# Patient Record
Sex: FEMALE | Race: WHITE | NOT HISPANIC OR LATINO | Employment: OTHER | URBAN - METROPOLITAN AREA
[De-identification: names, ages, dates, MRNs, and addresses within clinical notes are randomized per-mention and may not be internally consistent; named-entity substitution may affect disease eponyms.]

---

## 2018-03-30 PROCEDURE — 87086 URINE CULTURE/COLONY COUNT: CPT | Performed by: FAMILY MEDICINE

## 2018-03-30 PROCEDURE — 87147 CULTURE TYPE IMMUNOLOGIC: CPT | Performed by: FAMILY MEDICINE

## 2018-04-02 ENCOUNTER — TELEPHONE (OUTPATIENT)
Dept: URGENT CARE | Facility: CLINIC | Age: 38
End: 2018-04-02

## 2018-04-02 NOTE — TELEPHONE ENCOUNTER
Called pt with urine culture results. Pt is feeling better and will let us know if she doesn't get better. Pt JOHNNY. Reviewed by LISA HARRIS

## 2018-05-15 PROBLEM — J20.9 ACUTE BRONCHITIS: Status: ACTIVE | Noted: 2018-05-15

## 2018-09-25 PROCEDURE — 87147 CULTURE TYPE IMMUNOLOGIC: CPT | Performed by: FAMILY MEDICINE

## 2018-09-25 PROCEDURE — 87086 URINE CULTURE/COLONY COUNT: CPT | Performed by: FAMILY MEDICINE

## 2018-09-27 ENCOUNTER — TELEPHONE (OUTPATIENT)
Dept: URGENT CARE | Facility: CLINIC | Age: 38
End: 2018-09-27

## 2018-09-27 DIAGNOSIS — N30.00 ACUTE CYSTITIS WITHOUT HEMATURIA: Primary | ICD-10-CM

## 2018-09-27 RX ORDER — CEPHALEXIN 500 MG/1
500 CAPSULE ORAL 3 TIMES DAILY
Qty: 21 CAPSULE | Refills: 0 | OUTPATIENT
Start: 2018-09-27 | End: 2019-04-19

## 2018-09-27 NOTE — TELEPHONE ENCOUNTER
Spoke with pt regarding urine culture.  She reports no relief of symptoms.  Discussed changing antibiotics and and pt requested diflucan.  Pt verbalized understanding of change and following up if not improved in a few days.  Pt reports she has taken Omnicef before without reaction.

## 2019-04-19 PROBLEM — N30.01 ACUTE CYSTITIS WITH HEMATURIA: Status: ACTIVE | Noted: 2019-04-19

## 2019-04-19 PROCEDURE — 87147 CULTURE TYPE IMMUNOLOGIC: CPT | Performed by: NURSE PRACTITIONER

## 2019-04-19 PROCEDURE — 87086 URINE CULTURE/COLONY COUNT: CPT | Performed by: NURSE PRACTITIONER

## 2019-04-19 PROCEDURE — 87088 URINE BACTERIA CULTURE: CPT | Performed by: NURSE PRACTITIONER

## 2019-04-19 PROCEDURE — 87186 SC STD MICRODIL/AGAR DIL: CPT | Performed by: NURSE PRACTITIONER

## 2019-04-22 ENCOUNTER — TELEPHONE (OUTPATIENT)
Dept: URGENT CARE | Facility: CLINIC | Age: 39
End: 2019-04-22

## 2019-04-23 ENCOUNTER — TELEPHONE (OUTPATIENT)
Dept: URGENT CARE | Facility: CLINIC | Age: 39
End: 2019-04-23

## 2019-04-25 ENCOUNTER — TELEPHONE (OUTPATIENT)
Dept: URGENT CARE | Facility: CLINIC | Age: 39
End: 2019-04-25

## 2019-05-10 ENCOUNTER — TELEPHONE (OUTPATIENT)
Dept: URGENT CARE | Facility: CLINIC | Age: 39
End: 2019-05-10

## 2019-09-21 ENCOUNTER — APPOINTMENT (OUTPATIENT)
Dept: CT IMAGING | Facility: HOSPITAL | Age: 39
End: 2019-09-21

## 2019-09-21 ENCOUNTER — HOSPITAL ENCOUNTER (EMERGENCY)
Facility: HOSPITAL | Age: 39
Discharge: HOME | End: 2019-09-21
Attending: EMERGENCY MEDICINE | Admitting: EMERGENCY MEDICINE

## 2019-09-21 VITALS
SYSTOLIC BLOOD PRESSURE: 152 MMHG | OXYGEN SATURATION: 99 % | RESPIRATION RATE: 16 BRPM | WEIGHT: 143 LBS | DIASTOLIC BLOOD PRESSURE: 100 MMHG | HEIGHT: 64 IN | HEART RATE: 88 BPM | BODY MASS INDEX: 24.41 KG/M2 | TEMPERATURE: 98.5 F

## 2019-09-21 DIAGNOSIS — R59.0 INGUINAL LYMPHADENOPATHY: Primary | ICD-10-CM

## 2019-09-21 LAB
ALBUMIN SERPL-MCNC: 4.8 G/DL (ref 3.5–5.2)
ALBUMIN/GLOB SERPL: 1.7 G/DL
ALP SERPL-CCNC: 61 U/L (ref 39–117)
ALT SERPL W P-5'-P-CCNC: 10 U/L (ref 1–33)
ANION GAP SERPL CALCULATED.3IONS-SCNC: 13 MMOL/L (ref 5–15)
AST SERPL-CCNC: 19 U/L (ref 1–32)
B-HCG UR QL: NEGATIVE
BACTERIA UR QL AUTO: ABNORMAL /HPF
BASOPHILS # BLD AUTO: 0.03 10*3/MM3 (ref 0–0.2)
BASOPHILS NFR BLD AUTO: 0.4 % (ref 0–1.5)
BILIRUB SERPL-MCNC: 0.3 MG/DL (ref 0.2–1.2)
BILIRUB UR QL STRIP: NEGATIVE
BUN BLD-MCNC: 10 MG/DL (ref 6–20)
BUN/CREAT SERPL: 13.2 (ref 7–25)
CALCIUM SPEC-SCNC: 9.2 MG/DL (ref 8.6–10.5)
CHLORIDE SERPL-SCNC: 104 MMOL/L (ref 98–107)
CLARITY UR: ABNORMAL
CO2 SERPL-SCNC: 25 MMOL/L (ref 22–29)
COLOR UR: YELLOW
CREAT BLD-MCNC: 0.76 MG/DL (ref 0.57–1)
DEPRECATED RDW RBC AUTO: 45.3 FL (ref 37–54)
EOSINOPHIL # BLD AUTO: 0.09 10*3/MM3 (ref 0–0.4)
EOSINOPHIL NFR BLD AUTO: 1.2 % (ref 0.3–6.2)
ERYTHROCYTE [DISTWIDTH] IN BLOOD BY AUTOMATED COUNT: 16.6 % (ref 12.3–15.4)
GFR SERPL CREATININE-BSD FRML MDRD: 85 ML/MIN/1.73
GLOBULIN UR ELPH-MCNC: 2.9 GM/DL
GLUCOSE BLD-MCNC: 103 MG/DL (ref 65–99)
GLUCOSE UR STRIP-MCNC: NEGATIVE MG/DL
HCT VFR BLD AUTO: 34.6 % (ref 34–46.6)
HGB BLD-MCNC: 10.1 G/DL (ref 12–15.9)
HGB UR QL STRIP.AUTO: ABNORMAL
HOLD SPECIMEN: NORMAL
HOLD SPECIMEN: NORMAL
HYALINE CASTS UR QL AUTO: ABNORMAL /LPF
IMM GRANULOCYTES # BLD AUTO: 0.02 10*3/MM3 (ref 0–0.05)
IMM GRANULOCYTES NFR BLD AUTO: 0.3 % (ref 0–0.5)
INTERNAL NEGATIVE CONTROL: NEGATIVE
INTERNAL POSITIVE CONTROL: POSITIVE
KETONES UR QL STRIP: NEGATIVE
LEUKOCYTE ESTERASE UR QL STRIP.AUTO: NEGATIVE
LIPASE SERPL-CCNC: 34 U/L (ref 13–60)
LYMPHOCYTES # BLD AUTO: 2.56 10*3/MM3 (ref 0.7–3.1)
LYMPHOCYTES NFR BLD AUTO: 33.5 % (ref 19.6–45.3)
Lab: NORMAL
MCH RBC QN AUTO: 22.2 PG (ref 26.6–33)
MCHC RBC AUTO-ENTMCNC: 29.2 G/DL (ref 31.5–35.7)
MCV RBC AUTO: 76.2 FL (ref 79–97)
MONOCYTES # BLD AUTO: 0.31 10*3/MM3 (ref 0.1–0.9)
MONOCYTES NFR BLD AUTO: 4.1 % (ref 5–12)
NEUTROPHILS # BLD AUTO: 4.63 10*3/MM3 (ref 1.7–7)
NEUTROPHILS NFR BLD AUTO: 60.5 % (ref 42.7–76)
NITRITE UR QL STRIP: NEGATIVE
NRBC BLD AUTO-RTO: 0 /100 WBC (ref 0–0.2)
PH UR STRIP.AUTO: 7.5 [PH] (ref 5–8)
PLATELET # BLD AUTO: 518 10*3/MM3 (ref 140–450)
PMV BLD AUTO: 8.9 FL (ref 6–12)
POTASSIUM BLD-SCNC: 3.5 MMOL/L (ref 3.5–5.2)
PROT SERPL-MCNC: 7.7 G/DL (ref 6–8.5)
PROT UR QL STRIP: NEGATIVE
RBC # BLD AUTO: 4.54 10*6/MM3 (ref 3.77–5.28)
RBC # UR: ABNORMAL /HPF
REF LAB TEST METHOD: ABNORMAL
SODIUM BLD-SCNC: 142 MMOL/L (ref 136–145)
SP GR UR STRIP: 1.01 (ref 1–1.03)
SQUAMOUS #/AREA URNS HPF: ABNORMAL /HPF
UROBILINOGEN UR QL STRIP: ABNORMAL
WBC NRBC COR # BLD: 7.64 10*3/MM3 (ref 3.4–10.8)
WBC UR QL AUTO: ABNORMAL /HPF
WHOLE BLOOD HOLD SPECIMEN: NORMAL
WHOLE BLOOD HOLD SPECIMEN: NORMAL

## 2019-09-21 PROCEDURE — 81025 URINE PREGNANCY TEST: CPT | Performed by: EMERGENCY MEDICINE

## 2019-09-21 PROCEDURE — 83690 ASSAY OF LIPASE: CPT | Performed by: EMERGENCY MEDICINE

## 2019-09-21 PROCEDURE — 99283 EMERGENCY DEPT VISIT LOW MDM: CPT

## 2019-09-21 PROCEDURE — 81001 URINALYSIS AUTO W/SCOPE: CPT | Performed by: EMERGENCY MEDICINE

## 2019-09-21 PROCEDURE — 25010000002 IOPAMIDOL 61 % SOLUTION: Performed by: EMERGENCY MEDICINE

## 2019-09-21 PROCEDURE — 74177 CT ABD & PELVIS W/CONTRAST: CPT

## 2019-09-21 PROCEDURE — 80053 COMPREHEN METABOLIC PANEL: CPT | Performed by: EMERGENCY MEDICINE

## 2019-09-21 PROCEDURE — 85025 COMPLETE CBC W/AUTO DIFF WBC: CPT | Performed by: EMERGENCY MEDICINE

## 2019-09-21 RX ORDER — SODIUM CHLORIDE 0.9 % (FLUSH) 0.9 %
10 SYRINGE (ML) INJECTION AS NEEDED
Status: DISCONTINUED | OUTPATIENT
Start: 2019-09-21 | End: 2019-09-21 | Stop reason: HOSPADM

## 2019-09-21 RX ORDER — CEPHALEXIN 500 MG/1
500 CAPSULE ORAL 4 TIMES DAILY
Qty: 40 CAPSULE | Refills: 0 | OUTPATIENT
Start: 2019-09-21 | End: 2020-04-14

## 2019-09-21 RX ADMIN — IOPAMIDOL 80 ML: 612 INJECTION, SOLUTION INTRAVENOUS at 18:08

## 2019-09-21 NOTE — DISCHARGE INSTRUCTIONS
Keflex as prescribed.  Return if worse.  Choose a provider from the list below to establish a PCP and make arrangements for follow up.  Follow up with one of the [facility] Primary Care Providers below to setup primary care. If you need assistance coordinating a primary care appointment with a [facility] Primary Care Provider, please contact the Primary Care Coordinators at [phone] for appointment scheduling.    [facility], Primary Care   [address], [address]   [place], [place] [address] [phone]    [facility] Internal Medicine & Endocrinology  [address], [address]  [place] [address] [phone]    [facility] Family Medicine  [address], [address]   [place] [address] [phone]    [facility] Primary Care  [address], [address]  [place], [place] [address] [phone]    [facility], Primary Care,   [address], [address]   [place], [place] [address] [phone]    [facility] Primary Care  [address]  [place], [place] [address] [phone]    [facility], Primary Care  [address], [address]  [place], [place] [address] [phone]    [facility] Internal Medicine & Pediatrics  [address], [address]   [place] [address] [phone]    [facility], Primary Care  [address] [address] [phone]      [facility] Primary Care  [address], [address]   [place] [address] [phone]    [facility] Family Medicine  [address] [place] [address] [place] [address] [phone]

## 2019-09-21 NOTE — ED PROVIDER NOTES
"Subjective   Louise Martinez is a 39 y.o. female who presents to the ED with complaints of lower abdominal pain. She states that she has a swollen \"lymphnode\" to her right groin, and this is the cause of her abdominal pain. She relates that she had similar symptoms in 2008, however, it was not thi severe. She also complains of back pain and sleep disturbance secondary to her pain. She denies any fever, nausea, vomiting, bowel changes, vaginal discharge, dysuria, or any urinary symptoms. She advises that she has an IUD that was suppose to be removed in 2017. She is not on any daily medications. She has no significant past medical history. Her past surgical history includes a bladder suspension. She smokes half a pack per day. She denies any alcohol use or IV drug use. She states she has smoked marijuana in the past. She does not have a PCP. There are no other acute complaints at this time.        History provided by:  Patient  Abdominal Pain   Pain location:  LLQ and RLQ  Pain radiates to:  Does not radiate  Pain severity:  Moderate  Onset quality:  Sudden  Timing:  Constant  Progression:  Unchanged  Chronicity:  New  Relieved by:  None tried  Worsened by:  Nothing  Ineffective treatments:  None tried  Associated symptoms: no chest pain, no constipation, no cough, no diarrhea, no dysuria, no fever, no nausea, no shortness of breath, no vaginal discharge and no vomiting    Risk factors: no alcohol abuse and not obese        Review of Systems   Constitutional: Negative for fever.   Respiratory: Negative for cough and shortness of breath.    Cardiovascular: Negative for chest pain.   Gastrointestinal: Positive for abdominal pain. Negative for constipation, diarrhea, nausea and vomiting.   Genitourinary: Negative for decreased urine volume, difficulty urinating, dysuria, urgency and vaginal discharge.   Musculoskeletal: Positive for back pain.   Skin: Negative for rash.   Allergic/Immunologic: Negative for " immunocompromised state.   Neurological: Negative for headaches.   Hematological: Positive for adenopathy (palpable right inguinal node).   Psychiatric/Behavioral: Positive for sleep disturbance.   All other systems reviewed and are negative.      History reviewed. No pertinent past medical history.    Allergies   Allergen Reactions   • Sulfa Antibiotics Hives   • Amoxicillin Rash       Past Surgical History:   Procedure Laterality Date   • BLADDER SUSPENSION         History reviewed. No pertinent family history.    Social History     Socioeconomic History   • Marital status: Single     Spouse name: Not on file   • Number of children: Not on file   • Years of education: Not on file   • Highest education level: Not on file   Tobacco Use   • Smoking status: Current Every Day Smoker   • Smokeless tobacco: Never Used   Substance and Sexual Activity   • Alcohol use: Yes   • Drug use: No   • Sexual activity: Yes     Partners: Male, Female         Objective   Physical Exam   Constitutional: She is oriented to person, place, and time. She appears well-developed and well-nourished. No distress.   HENT:   Head: Normocephalic and atraumatic.   Nose: Nose normal.   Mouth/Throat: Oropharynx is clear and moist.   Eyes: Conjunctivae are normal. Pupils are equal, round, and reactive to light. No scleral icterus.   Neck: Normal range of motion. Neck supple.   Cardiovascular: Normal rate, regular rhythm and normal heart sounds.   No murmur heard.  Pulmonary/Chest: Effort normal and breath sounds normal. No respiratory distress.   Abdominal: Soft. She exhibits no distension. There is no tenderness.   Prominence in right groin with tenderness with palpation. No abdominal tenderness with palpation.   Musculoskeletal: Normal range of motion. She exhibits tenderness. She exhibits no edema.   No lower extremity edema. Prominence in right groin with tenderness with palpation.   Neurological: She is alert and oriented to person, place, and  time.   Skin: Skin is warm and dry.   Psychiatric: She has a normal mood and affect. Her behavior is normal.   Nursing note and vitals reviewed.      Procedures         ED Course      The patient appears in no distress.  There is a palpable prominence in the right groin that appears to be consistent with a lymph node.  No erythema.  There is tenderness on palpation of the node.  The patient has no visible wounds other than a small well-healing scratch to the right shin.  White count is normal at 7.64.  CT of the pelvis with contrast shows a small right inguinal node with no other acute findings.  Given the increasing prominence and tenderness of the right inguinal node, as well as history of a recent minor wound to the right shin, I will treat her with a short course of Keflex to see if her adenopathy resolves.  I do not see any indication of blood dyscrasias.  I will plan to discharge her for follow-up.  Recent Results (from the past 24 hour(s))   Comprehensive Metabolic Panel    Collection Time: 09/21/19  4:46 PM   Result Value Ref Range    Glucose 103 (H) 65 - 99 mg/dL    BUN 10 6 - 20 mg/dL    Creatinine 0.76 0.57 - 1.00 mg/dL    Sodium 142 136 - 145 mmol/L    Potassium 3.5 3.5 - 5.2 mmol/L    Chloride 104 98 - 107 mmol/L    CO2 25.0 22.0 - 29.0 mmol/L    Calcium 9.2 8.6 - 10.5 mg/dL    Total Protein 7.7 6.0 - 8.5 g/dL    Albumin 4.80 3.50 - 5.20 g/dL    ALT (SGPT) 10 1 - 33 U/L    AST (SGOT) 19 1 - 32 U/L    Alkaline Phosphatase 61 39 - 117 U/L    Total Bilirubin 0.3 0.2 - 1.2 mg/dL    eGFR Non African Amer 85 >60 mL/min/1.73    Globulin 2.9 gm/dL    A/G Ratio 1.7 g/dL    BUN/Creatinine Ratio 13.2 7.0 - 25.0    Anion Gap 13.0 5.0 - 15.0 mmol/L   Lipase    Collection Time: 09/21/19  4:46 PM   Result Value Ref Range    Lipase 34 13 - 60 U/L   Urinalysis With Microscopic If Indicated (No Culture) - Urine, Clean Catch    Collection Time: 09/21/19  4:46 PM   Result Value Ref Range    Color, UA Yellow Yellow, Straw     Appearance, UA Cloudy (A) Clear    pH, UA 7.5 5.0 - 8.0    Specific Gravity, UA 1.012 1.001 - 1.030    Glucose, UA Negative Negative    Ketones, UA Negative Negative    Bilirubin, UA Negative Negative    Blood, UA Moderate (2+) (A) Negative    Protein, UA Negative Negative    Leuk Esterase, UA Negative Negative    Nitrite, UA Negative Negative    Urobilinogen, UA 0.2 E.U./dL 0.2 - 1.0 E.U./dL   Light Blue Top    Collection Time: 09/21/19  4:46 PM   Result Value Ref Range    Extra Tube hold for add-on    Green Top (Gel)    Collection Time: 09/21/19  4:46 PM   Result Value Ref Range    Extra Tube Hold for add-ons.    Lavender Top    Collection Time: 09/21/19  4:46 PM   Result Value Ref Range    Extra Tube hold for add-on    Gold Top - SST    Collection Time: 09/21/19  4:46 PM   Result Value Ref Range    Extra Tube Hold for add-ons.    CBC Auto Differential    Collection Time: 09/21/19  4:46 PM   Result Value Ref Range    WBC 7.64 3.40 - 10.80 10*3/mm3    RBC 4.54 3.77 - 5.28 10*6/mm3    Hemoglobin 10.1 (L) 12.0 - 15.9 g/dL    Hematocrit 34.6 34.0 - 46.6 %    MCV 76.2 (L) 79.0 - 97.0 fL    MCH 22.2 (L) 26.6 - 33.0 pg    MCHC 29.2 (L) 31.5 - 35.7 g/dL    RDW 16.6 (H) 12.3 - 15.4 %    RDW-SD 45.3 37.0 - 54.0 fl    MPV 8.9 6.0 - 12.0 fL    Platelets 518 (H) 140 - 450 10*3/mm3    Neutrophil % 60.5 42.7 - 76.0 %    Lymphocyte % 33.5 19.6 - 45.3 %    Monocyte % 4.1 (L) 5.0 - 12.0 %    Eosinophil % 1.2 0.3 - 6.2 %    Basophil % 0.4 0.0 - 1.5 %    Immature Grans % 0.3 0.0 - 0.5 %    Neutrophils, Absolute 4.63 1.70 - 7.00 10*3/mm3    Lymphocytes, Absolute 2.56 0.70 - 3.10 10*3/mm3    Monocytes, Absolute 0.31 0.10 - 0.90 10*3/mm3    Eosinophils, Absolute 0.09 0.00 - 0.40 10*3/mm3    Basophils, Absolute 0.03 0.00 - 0.20 10*3/mm3    Immature Grans, Absolute 0.02 0.00 - 0.05 10*3/mm3    nRBC 0.0 0.0 - 0.2 /100 WBC   Urinalysis, Microscopic Only - Urine, Clean Catch    Collection Time: 09/21/19  4:46 PM   Result Value Ref Range  "   RBC, UA 7-12 (A) None Seen, 0-2 /HPF    WBC, UA 0-2 None Seen, 0-2 /HPF    Bacteria, UA None Seen None Seen, Trace /HPF    Squamous Epithelial Cells, UA 0-2 None Seen, 0-2 /HPF    Hyaline Casts, UA 0-6 0 - 6 /LPF    Methodology Automated Microscopy    POCT pregnancy, urine    Collection Time: 09/21/19  5:11 PM   Result Value Ref Range    HCG, Urine, QL Negative Negative    Lot Number [id number]     Internal Positive Control Positive     Internal Negative Control Negative      Note: In addition to lab results from this visit, the labs listed above may include labs taken at another facility or during a different encounter within the last 24 hours. Please correlate lab times with ED admission and discharge times for further clarification of the services performed during this visit.    CT Abdomen Pelvis With Contrast   Preliminary Result   1.9 cm mildly enlarged lymph node in the right inguinal   region. No additional area of abnormality identified. No acute   intra-abdominal or pelvic abnormality present. IUD within the uterus   with largest fibroid identified within the uterus. Continued follow-up   is recommended as clinically indicated.       DICTATED:   09/21/2019   EDITED/ls :   09/21/2019             Vitals:    09/21/19 1620   BP: 138/93   BP Location: Left arm   Patient Position: Sitting   Pulse: 88   Resp: 16   Temp: 98.5 °F (36.9 °C)   TempSrc: Oral   SpO2: 100%   Weight: 64.9 kg (143 lb)   Height: 162.6 cm (64\")     Medications   sodium chloride 0.9 % flush 10 mL (not administered)   iopamidol (ISOVUE-300) 61 % injection 100 mL (80 mL Intravenous Given 9/21/19 1808)     ECG/EMG Results (last 24 hours)     ** No results found for the last 24 hours. **        No orders to display                       MDM    Final diagnoses:   Inguinal lymphadenopathy       Documentation assistance provided by garima Martin.  Information recorded by the scriborly was done at my direction and has been verified and " validated by me.     Lynette Martin  09/21/19 1709       Olvin Giles, PA  09/21/19 1471

## 2019-12-17 ENCOUNTER — HOSPITAL ENCOUNTER (EMERGENCY)
Facility: HOSPITAL | Age: 39
Discharge: HOME | End: 2019-12-18
Attending: EMERGENCY MEDICINE | Admitting: EMERGENCY MEDICINE

## 2019-12-17 DIAGNOSIS — J10.1 INFLUENZA A: Primary | ICD-10-CM

## 2019-12-17 PROCEDURE — 99283 EMERGENCY DEPT VISIT LOW MDM: CPT

## 2019-12-18 ENCOUNTER — APPOINTMENT (OUTPATIENT)
Dept: GENERAL RADIOLOGY | Facility: HOSPITAL | Age: 39
End: 2019-12-18

## 2019-12-18 VITALS
HEART RATE: 100 BPM | DIASTOLIC BLOOD PRESSURE: 98 MMHG | HEIGHT: 64 IN | WEIGHT: 145 LBS | OXYGEN SATURATION: 99 % | SYSTOLIC BLOOD PRESSURE: 142 MMHG | BODY MASS INDEX: 24.75 KG/M2 | TEMPERATURE: 98.3 F | RESPIRATION RATE: 16 BRPM

## 2019-12-18 LAB
FLUAV AG NPH QL: POSITIVE
FLUBV AG NPH QL IA: NEGATIVE

## 2019-12-18 PROCEDURE — 63710000001 PREDNISONE PER 1 MG: Performed by: EMERGENCY MEDICINE

## 2019-12-18 PROCEDURE — 71045 X-RAY EXAM CHEST 1 VIEW: CPT

## 2019-12-18 PROCEDURE — 94640 AIRWAY INHALATION TREATMENT: CPT

## 2019-12-18 PROCEDURE — 87804 INFLUENZA ASSAY W/OPTIC: CPT | Performed by: EMERGENCY MEDICINE

## 2019-12-18 RX ORDER — IPRATROPIUM BROMIDE AND ALBUTEROL SULFATE 2.5; .5 MG/3ML; MG/3ML
3 SOLUTION RESPIRATORY (INHALATION) ONCE
Status: COMPLETED | OUTPATIENT
Start: 2019-12-18 | End: 2019-12-18

## 2019-12-18 RX ORDER — ONDANSETRON 4 MG/1
4 TABLET, ORALLY DISINTEGRATING ORAL 4 TIMES DAILY PRN
Qty: 15 TABLET | Refills: 0 | OUTPATIENT
Start: 2019-12-18 | End: 2020-04-22 | Stop reason: HOSPADM

## 2019-12-18 RX ORDER — IBUPROFEN 600 MG/1
600 TABLET ORAL EVERY 4 HOURS PRN
Status: DISCONTINUED | OUTPATIENT
Start: 2019-12-18 | End: 2019-12-18 | Stop reason: HOSPADM

## 2019-12-18 RX ORDER — ALBUTEROL SULFATE 90 UG/1
2 AEROSOL, METERED RESPIRATORY (INHALATION) EVERY 4 HOURS PRN
Qty: 1 INHALER | Refills: 0 | OUTPATIENT
Start: 2019-12-18 | End: 2020-04-22 | Stop reason: HOSPADM

## 2019-12-18 RX ORDER — PREDNISONE 20 MG/1
60 TABLET ORAL ONCE
Status: COMPLETED | OUTPATIENT
Start: 2019-12-18 | End: 2019-12-18

## 2019-12-18 RX ORDER — METHYLPREDNISOLONE 4 MG/1
TABLET ORAL
Qty: 1 EACH | Refills: 0 | OUTPATIENT
Start: 2019-12-18 | End: 2020-04-14

## 2019-12-18 RX ADMIN — IBUPROFEN 600 MG: 600 TABLET ORAL at 01:09

## 2019-12-18 RX ADMIN — PREDNISONE 60 MG: 20 TABLET ORAL at 01:09

## 2019-12-18 RX ADMIN — IPRATROPIUM BROMIDE AND ALBUTEROL SULFATE 3 ML: 2.5; .5 SOLUTION RESPIRATORY (INHALATION) at 01:15

## 2019-12-18 NOTE — DISCHARGE INSTRUCTIONS
Follow up with one of the [facility] Primary Care Providers below to setup primary care. If you need assistance coordinating a primary care appointment with a [facility] Primary Care Provider, please contact the Primary Care Coordinators at [phone] for appointment scheduling.    [facility], Primary Care   [address]   [place], [place] [address] [phone]    [facility] Internal Medicine & Endocrinology  [address]  [place] [address] [phone]    [facility] Family Medicine  [address]   [place] [address] [phone]    [facility] Primary Care  [address], [address]  [place], [place] [address] [phone]    [facility], Primary Care,   [address]   [place], [place] [address] [phone]    [facility] Primary Care  [address]  [place], [place] [address] [phone]    [facility], Primary Care  [address]  [place], [place] [address] [phone]    [facility] Internal Medicine & Pediatrics  [address]   [place] [address] [phone]    [facility], Primary Care  [address] [place] [address] [phone]      [facility] Primary Care  [address]   [place] [address] [phone]    [facility] Family Medicine  [address] [place] [address] [phone]

## 2019-12-18 NOTE — ED PROVIDER NOTES
[facility] EMERGENCY DEPARTMENT    eMERGENCY dEPARTMENT eNCOUnter      Pt Name: Louise Martinez  MRN: [id number]  YOB: 1980  Date of evaluation: 12/17/2019  Provider: Kev Reis DO    CHIEF COMPLAINT       Chief Complaint   Patient presents with   • URI         HISTORY OF PRESENT ILLNESS  (Location/Symptom, Timing/Onset, Context/Setting, Quality, Duration, Modifying Factors, Severity.)   Louise Martinez is a 39 y.o. female who presents to the emergency department with c/o upper respiratory infection. The patient began having rhinorrhea and a productive cough 1 week ago but 2 nights she began wheezing. She has had sick contact with her son who has had similar symptoms and she is a current smoker. The patient complains of myalgia. She is not taking any current daily medications and has no pertinent past medical history. The patient denies getting a flu shot this year. There are no other acute complaints at this time.    Nursing notes were reviewed.    REVIEW OF SYSTEMS    (2-9 systems for level 4, 10 or more for level 5)   ROS:  General:  No fevers, no chills, no weakness, + rhinorrhea  Cardiovascular:  No chest pain, no palpitations  Respiratory:  No shortness of breath, + cough, + wheezing  Gastrointestinal:  No pain, no nausea, no vomiting, no diarrhea  Musculoskeletal:  No joint pain, + myalgia  Skin:  No rash, no easy bruising  Neurologic:  No speech problems, no headache, no extremity numbness, no extremity tingling, no extremity weakness  Psychiatric:  No anxiety  Genitourinary:  No dysuria, no hematuria    Except as noted above the remainder of the review of systems was reviewed and negative.       PAST MEDICAL HISTORY   No past medical history on file.      SURGICAL HISTORY       Past Surgical History:   Procedure Laterality Date   • BLADDER SUSPENSION           CURRENT MEDICATIONS     No current facility-administered medications for this encounter.     Current  "Outpatient Medications:   •  albuterol sulfate  (90 Base) MCG/ACT inhaler, Inhale 2 puffs Every 4 (Four) Hours As Needed for Wheezing or Shortness of Air., Disp: 1 inhaler, Rfl: 0  •  cephalexin (KEFLEX) 500 MG capsule, Take 1 capsule by mouth 4 (Four) Times a Day., Disp: 40 capsule, Rfl: 0  •  HYDROcodone-homatropine (HYCODAN) 5-1.5 MG/5ML syrup, Take 5 mL by mouth Every 6 (Six) Hours As Needed for Cough., Disp: 100 mL, Rfl: 0  •  methylPREDNISolone (MEDROL, AMMON,) 4 MG tablet, Take as directed on package instructions., Disp: 1 each, Rfl: 0  •  ondansetron ODT (ZOFRAN-ODT) 4 MG disintegrating tablet, Take 1 tablet by mouth 4 (Four) Times a Day As Needed for Nausea or Vomiting., Disp: 15 tablet, Rfl: 0    ALLERGIES     Sulfa antibiotics and Amoxicillin    FAMILY HISTORY     No family history on file.       SOCIAL HISTORY       Social History     Socioeconomic History   • Marital status: Single     Spouse name: Not on file   • Number of children: Not on file   • Years of education: Not on file   • Highest education level: Not on file   Tobacco Use   • Smoking status: Current Every Day Smoker     Packs/day: 0.50   • Smokeless tobacco: Never Used   Substance and Sexual Activity   • Alcohol use: Yes   • Drug use: No   • Sexual activity: Yes     Partners: Male, Female         PHYSICAL EXAM    (up to 7 for level 4, 8 or more for level 5)     Vitals:    12/17/19 2238 12/18/19 0115 12/18/19 0146   BP: 143/94  142/98   BP Location: Left arm     Patient Position: Sitting     Pulse: 107 100    Resp: 16 16    Temp: 98.3 °F (36.8 °C)     TempSrc: Oral     SpO2: 100%  99%   Weight: 65.8 kg (145 lb)     Height: 162.6 cm (64\")         Physical Exam  General :Patient is awake, alert, oriented, in no acute distress, nontoxic appearing  HEENT: Pupils are equally round and reactive to light, EOMI, conjunctivae clear, sclerae white, there is no injection no icterus.  Oral mucosa is moist, no exudate. Uvula is midline  Neck: Neck " is supple, full range of motion, trachea midline, no meningeal signs  Cardiac: Heart regular rate, rhythm, no murmurs, rubs, or gallops  Lungs: Lungs are clear to auscultation, there is no rales. There is no use of accessory muscles. Scattered rhonchi at the bilateral bases. Right sided expiratory wheezing.  Chest wall: There is no tenderness to palpation over the chest wall or over ribs  Abdomen: Abdomen is soft, nontender, nondistended. There is no firm or pulsatile masses, no rebound rigidity or guarding.   Musculoskeletal: 5 out of 5 strength in all 4 extremities.  No focal muscle deficits are appreciated  Neuro: Motor intact, sensory intact, level of consciousness is normal  Dermatology: Skin is warm and dry  Psych: Mentation is grossly normal, cognition is grossly normal. Affect is appropriate.      DIAGNOSTIC RESULTS     EKG: All EKG's are interpreted by the Emergency Department Physician who either signs or Co-signs this chart in the absence of a cardiologist.    No orders to display       RADIOLOGY:   Non-plain film images such as CT, Ultrasound and MRI are read by the radiologist. Plain radiographic images are visualized and preliminarily interpreted by the emergency physician with the below findings:      [] Radiologist's Report Reviewed:  XR Chest 1 View   Final Result   Negative chest.      Signer Name: Ricardo Yusuf MD    Signed: 12/18/2019 12:46 AM    Workstation Name: [email or web address]     [facility]            ED BEDSIDE ULTRASOUND:   Performed by ED Physician - none    LABS:    I have reviewed and interpreted all of the currently available lab results from this visit (if applicable):  Results for orders placed or performed during the hospital encounter of 12/17/19   Influenza Antigen, Rapid - Swab, Nasopharynx   Result Value Ref Range    Influenza A Ag, EIA Positive (A) Negative    Influenza B Ag, EIA Negative Negative        All other labs were within normal range or not  "returned as of this dictation.      EMERGENCY DEPARTMENT COURSE and DIFFERENTIAL DIAGNOSIS/MDM:   Vitals:    Vitals:    12/17/19 2238 12/18/19 0115 12/18/19 0146   BP: 143/94  142/98   BP Location: Left arm     Patient Position: Sitting     Pulse: 107 100    Resp: 16 16    Temp: 98.3 °F (36.8 °C)     TempSrc: Oral     SpO2: 100%  99%   Weight: 65.8 kg (145 lb)     Height: 162.6 cm (64\")         ED Course as of Dec 18 0724   Wed Dec 18, 2019   0144 Dr. Reis is bedside re-evaluating the patient and updating her on the results of the studies.    [BS]      ED Course User Index  [BS] Mauri Sal   !    Patient with cough and congestion, symptoms upper respiratory infection for the last week.  No fevers, she tachycardic upon arrival, is not appear acutely toxic.  Does have mild cough and congestion with some slight wheezing in her right lung base.  We did obtain chest x-ray, influenza, she is positive for influenza A, she did not get her vaccination this year.  Since she has been symptomatic for greater than 72 hours we discussed symptomatic therapies, good fluid hydration, rest, following with her PCP for reevaluation.  Limiting further exposures.  Recommended flu shot in the future. The patient will follow-up with their PCP in 1-2 days for reevaluation.  If the patient or family members have any further concerns or patient has any worsening symptoms they will return to the ED for reevaluation.      MEDICATIONS ADMINISTERED IN ED:  Medications   ipratropium-albuterol (DUO-NEB) nebulizer solution 3 mL (3 mL Nebulization Given 12/18/19 0115)   predniSONE (DELTASONE) tablet 60 mg (60 mg Oral Given 12/18/19 0109)       PROCEDURES:  Procedures    CRITICAL CARE TIME    Total Critical Care time was 0 minutes, excluding separately reportable procedures.   There was a high probability of clinically significant/life threatening deterioration in the patient's condition which required my urgent intervention.      FINAL IMPRESSION "      1. Influenza A          DISPOSITION/PLAN     ED Disposition     ED Disposition Condition Comment    Discharge Stable           PATIENT REFERRED TO:  Your PCP or one on the list    Schedule an appointment as soon as possible for a visit       [facility] Emergency Department  [address] [address] [phone]    If symptoms worsen      DISCHARGE MEDICATIONS:     Medication List      START taking these medications    albuterol sulfate  (90 Base) MCG/ACT inhaler  Commonly known as:  PROVENTIL HFA;VENTOLIN HFA;PROAIR HFA  Inhale 2 puffs Every 4 (Four) Hours As Needed for Wheezing or Shortness of   Air.     HYDROcodone-homatropine 5-1.5 MG/5ML syrup  Commonly known as:  HYCODAN  Take 5 mL by mouth Every 6 (Six) Hours As Needed for Cough.     methylPREDNISolone 4 MG tablet  Commonly known as:  MEDROL (AMMON)  Take as directed on package instructions.     ondansetron ODT 4 MG disintegrating tablet  Commonly known as:  ZOFRAN-ODT  Take 1 tablet by mouth 4 (Four) Times a Day As Needed for Nausea or   Vomiting.        CONTINUE taking these medications    cephalexin 500 MG capsule  Commonly known as:  KEFLEX  Take 1 capsule by mouth 4 (Four) Times a Day.            Documentation assistance provided by Mauri Sal acting as scribe for Dr. Kev Reis.     The scribe's documentation has been prepared under my direction and personally reviewed by me in its entirety.  I confirm that the note above accurately reflects all work, treatment, procedures, and medical decision making performed by me.      Comment: Please note this report has been produced using speech recognition software.      Kev Reis DO  Attending Emergency Physician                 Mauri Sal  12/18/19 0115       Mauri Sal  12/18/19 0122       Kev Reis DO  12/18/19 0956

## 2020-03-17 PROCEDURE — U0003 INFECTIOUS AGENT DETECTION BY NUCLEIC ACID (DNA OR RNA); SEVERE ACUTE RESPIRATORY SYNDROME CORONAVIRUS 2 (SARS-COV-2) (CORONAVIRUS DISEASE [COVID-19]), AMPLIFIED PROBE TECHNIQUE, MAKING USE OF HIGH THROUGHPUT TECHNOLOGIES AS DESCRIBED BY CMS-2020-01-R: HCPCS | Performed by: FAMILY MEDICINE

## 2020-03-17 PROCEDURE — 87635 SARS-COV-2 COVID-19 AMP PRB: CPT | Performed by: FAMILY MEDICINE

## 2020-03-29 ENCOUNTER — TELEPHONE (OUTPATIENT)
Dept: URGENT CARE | Facility: CLINIC | Age: 40
End: 2020-03-29

## 2020-04-14 PROCEDURE — 87077 CULTURE AEROBIC IDENTIFY: CPT | Performed by: PHYSICIAN ASSISTANT

## 2020-04-14 PROCEDURE — 87186 SC STD MICRODIL/AGAR DIL: CPT | Performed by: PHYSICIAN ASSISTANT

## 2020-04-14 PROCEDURE — 87086 URINE CULTURE/COLONY COUNT: CPT | Performed by: PHYSICIAN ASSISTANT

## 2020-04-16 ENCOUNTER — TELEPHONE (OUTPATIENT)
Dept: URGENT CARE | Facility: CLINIC | Age: 40
End: 2020-04-16

## 2020-04-17 ENCOUNTER — TELEPHONE (OUTPATIENT)
Dept: URGENT CARE | Facility: CLINIC | Age: 40
End: 2020-04-17

## 2020-04-22 ENCOUNTER — APPOINTMENT (OUTPATIENT)
Dept: ULTRASOUND IMAGING | Facility: HOSPITAL | Age: 40
End: 2020-04-22

## 2020-04-22 ENCOUNTER — TELEPHONE (OUTPATIENT)
Dept: URGENT CARE | Facility: CLINIC | Age: 40
End: 2020-04-22

## 2020-04-22 ENCOUNTER — HOSPITAL ENCOUNTER (EMERGENCY)
Facility: HOSPITAL | Age: 40
Discharge: HOME | End: 2020-04-22
Attending: EMERGENCY MEDICINE | Admitting: EMERGENCY MEDICINE

## 2020-04-22 VITALS
BODY MASS INDEX: 24.75 KG/M2 | WEIGHT: 145 LBS | HEIGHT: 64 IN | SYSTOLIC BLOOD PRESSURE: 119 MMHG | OXYGEN SATURATION: 98 % | HEART RATE: 84 BPM | TEMPERATURE: 98.8 F | RESPIRATION RATE: 16 BRPM | DIASTOLIC BLOOD PRESSURE: 80 MMHG

## 2020-04-22 DIAGNOSIS — N76.0 BACTERIAL VAGINOSIS: ICD-10-CM

## 2020-04-22 DIAGNOSIS — R30.0 DYSURIA: ICD-10-CM

## 2020-04-22 DIAGNOSIS — Z20.2 POSSIBLE EXPOSURE TO STD: ICD-10-CM

## 2020-04-22 DIAGNOSIS — D25.9 UTERINE LEIOMYOMA, UNSPECIFIED LOCATION: ICD-10-CM

## 2020-04-22 DIAGNOSIS — R10.2 PELVIC PAIN: Primary | ICD-10-CM

## 2020-04-22 DIAGNOSIS — B96.89 BACTERIAL VAGINOSIS: ICD-10-CM

## 2020-04-22 LAB
ALBUMIN SERPL-MCNC: 4.8 G/DL (ref 3.5–5.2)
ALBUMIN/GLOB SERPL: 1.5 G/DL
ALP SERPL-CCNC: 61 U/L (ref 39–117)
ALT SERPL W P-5'-P-CCNC: 7 U/L (ref 1–33)
ANION GAP SERPL CALCULATED.3IONS-SCNC: 16 MMOL/L (ref 5–15)
AST SERPL-CCNC: 17 U/L (ref 1–32)
B-HCG UR QL: NEGATIVE
BACTERIA UR QL AUTO: ABNORMAL /HPF
BASOPHILS # BLD AUTO: 0.06 10*3/MM3 (ref 0–0.2)
BASOPHILS NFR BLD AUTO: 0.8 % (ref 0–1.5)
BILIRUB SERPL-MCNC: 0.3 MG/DL (ref 0.2–1.2)
BILIRUB UR QL STRIP: NEGATIVE
BUN BLD-MCNC: 13 MG/DL (ref 6–20)
BUN/CREAT SERPL: 14.9 (ref 7–25)
CALCIUM SPEC-SCNC: 9.7 MG/DL (ref 8.6–10.5)
CHLORIDE SERPL-SCNC: 97 MMOL/L (ref 98–107)
CLARITY UR: CLEAR
CLUE CELLS SPEC QL WET PREP: ABNORMAL
CO2 SERPL-SCNC: 25 MMOL/L (ref 22–29)
COLOR UR: ABNORMAL
CREAT BLD-MCNC: 0.87 MG/DL (ref 0.57–1)
DEPRECATED RDW RBC AUTO: 43.2 FL (ref 37–54)
EOSINOPHIL # BLD AUTO: 0.04 10*3/MM3 (ref 0–0.4)
EOSINOPHIL NFR BLD AUTO: 0.5 % (ref 0.3–6.2)
ERYTHROCYTE [DISTWIDTH] IN BLOOD BY AUTOMATED COUNT: 16.7 % (ref 12.3–15.4)
GFR SERPL CREATININE-BSD FRML MDRD: 73 ML/MIN/1.73
GLOBULIN UR ELPH-MCNC: 3.2 GM/DL
GLUCOSE BLD-MCNC: 112 MG/DL (ref 65–99)
GLUCOSE UR STRIP-MCNC: NEGATIVE MG/DL
HCT VFR BLD AUTO: 35.6 % (ref 34–46.6)
HGB BLD-MCNC: 10.6 G/DL (ref 12–15.9)
HGB UR QL STRIP.AUTO: NEGATIVE
HYALINE CASTS UR QL AUTO: ABNORMAL /LPF
HYDATID CYST SPEC WET PREP: ABNORMAL
IMM GRANULOCYTES # BLD AUTO: 0.01 10*3/MM3 (ref 0–0.05)
IMM GRANULOCYTES NFR BLD AUTO: 0.1 % (ref 0–0.5)
INTERNAL NEGATIVE CONTROL: NEGATIVE
INTERNAL POSITIVE CONTROL: POSITIVE
KETONES UR QL STRIP: ABNORMAL
KOH PREP NAIL: NORMAL
LEUKOCYTE ESTERASE UR QL STRIP.AUTO: ABNORMAL
LYMPHOCYTES # BLD AUTO: 4.07 10*3/MM3 (ref 0.7–3.1)
LYMPHOCYTES NFR BLD AUTO: 51 % (ref 19.6–45.3)
Lab: NORMAL
MCH RBC QN AUTO: 21.6 PG (ref 26.6–33)
MCHC RBC AUTO-ENTMCNC: 29.8 G/DL (ref 31.5–35.7)
MCV RBC AUTO: 72.5 FL (ref 79–97)
MONOCYTES # BLD AUTO: 0.38 10*3/MM3 (ref 0.1–0.9)
MONOCYTES NFR BLD AUTO: 4.8 % (ref 5–12)
NEUTROPHILS # BLD AUTO: 3.42 10*3/MM3 (ref 1.7–7)
NEUTROPHILS NFR BLD AUTO: 42.8 % (ref 42.7–76)
NITRITE UR QL STRIP: NEGATIVE
NRBC BLD AUTO-RTO: 0 /100 WBC (ref 0–0.2)
PH UR STRIP.AUTO: 5.5 [PH] (ref 5–8)
PLATELET # BLD AUTO: 744 10*3/MM3 (ref 140–450)
PMV BLD AUTO: 9.4 FL (ref 6–12)
POTASSIUM BLD-SCNC: 3.6 MMOL/L (ref 3.5–5.2)
PROT SERPL-MCNC: 8 G/DL (ref 6–8.5)
PROT UR QL STRIP: NEGATIVE
RBC # BLD AUTO: 4.91 10*6/MM3 (ref 3.77–5.28)
RBC # UR: ABNORMAL /HPF
REF LAB TEST METHOD: ABNORMAL
SODIUM BLD-SCNC: 138 MMOL/L (ref 136–145)
SP GR UR STRIP: 1.02 (ref 1–1.03)
SQUAMOUS #/AREA URNS HPF: ABNORMAL /HPF
T VAGINALIS SPEC QL WET PREP: ABNORMAL
UROBILINOGEN UR QL STRIP: ABNORMAL
WBC NRBC COR # BLD: 7.98 10*3/MM3 (ref 3.4–10.8)
WBC SPEC QL WET PREP: ABNORMAL
WBC UR QL AUTO: ABNORMAL /HPF
YEAST GENITAL QL WET PREP: ABNORMAL

## 2020-04-22 PROCEDURE — 96372 THER/PROPH/DIAG INJ SC/IM: CPT

## 2020-04-22 PROCEDURE — 99284 EMERGENCY DEPT VISIT MOD MDM: CPT

## 2020-04-22 PROCEDURE — 25010000002 CEFTRIAXONE PER 250 MG: Performed by: NURSE PRACTITIONER

## 2020-04-22 PROCEDURE — 87210 SMEAR WET MOUNT SALINE/INK: CPT | Performed by: NURSE PRACTITIONER

## 2020-04-22 PROCEDURE — 81025 URINE PREGNANCY TEST: CPT | Performed by: NURSE PRACTITIONER

## 2020-04-22 PROCEDURE — 93976 VASCULAR STUDY: CPT

## 2020-04-22 PROCEDURE — 87591 N.GONORRHOEAE DNA AMP PROB: CPT | Performed by: NURSE PRACTITIONER

## 2020-04-22 PROCEDURE — 87220 TISSUE EXAM FOR FUNGI: CPT | Performed by: NURSE PRACTITIONER

## 2020-04-22 PROCEDURE — 85025 COMPLETE CBC W/AUTO DIFF WBC: CPT | Performed by: NURSE PRACTITIONER

## 2020-04-22 PROCEDURE — 80053 COMPREHEN METABOLIC PANEL: CPT | Performed by: NURSE PRACTITIONER

## 2020-04-22 PROCEDURE — 81001 URINALYSIS AUTO W/SCOPE: CPT | Performed by: NURSE PRACTITIONER

## 2020-04-22 PROCEDURE — 87491 CHLMYD TRACH DNA AMP PROBE: CPT | Performed by: NURSE PRACTITIONER

## 2020-04-22 PROCEDURE — 76830 TRANSVAGINAL US NON-OB: CPT

## 2020-04-22 RX ORDER — LIDOCAINE HYDROCHLORIDE 10 MG/ML
10 INJECTION, SOLUTION EPIDURAL; INFILTRATION; INTRACAUDAL; PERINEURAL ONCE
Status: COMPLETED | OUTPATIENT
Start: 2020-04-22 | End: 2020-04-22

## 2020-04-22 RX ORDER — METRONIDAZOLE 500 MG/1
500 TABLET ORAL 2 TIMES DAILY
Qty: 14 TABLET | Refills: 0 | Status: SHIPPED | OUTPATIENT
Start: 2020-04-22

## 2020-04-22 RX ORDER — AZITHROMYCIN 250 MG/1
1000 TABLET, FILM COATED ORAL ONCE
Status: COMPLETED | OUTPATIENT
Start: 2020-04-22 | End: 2020-04-22

## 2020-04-22 RX ORDER — SODIUM CHLORIDE 0.9 % (FLUSH) 0.9 %
10 SYRINGE (ML) INJECTION AS NEEDED
Status: DISCONTINUED | OUTPATIENT
Start: 2020-04-22 | End: 2020-04-23 | Stop reason: HOSPADM

## 2020-04-22 RX ORDER — CEFTRIAXONE SODIUM 250 MG/1
250 INJECTION, POWDER, FOR SOLUTION INTRAMUSCULAR; INTRAVENOUS ONCE
Status: COMPLETED | OUTPATIENT
Start: 2020-04-22 | End: 2020-04-22

## 2020-04-22 RX ADMIN — AZITHROMYCIN DIHYDRATE 1000 MG: 250 TABLET, FILM COATED ORAL at 21:40

## 2020-04-22 RX ADMIN — SODIUM CHLORIDE 1000 ML: 9 INJECTION, SOLUTION INTRAVENOUS at 20:39

## 2020-04-22 RX ADMIN — CEFTRIAXONE SODIUM 250 MG: 250 INJECTION, POWDER, FOR SOLUTION INTRAMUSCULAR; INTRAVENOUS at 21:41

## 2020-04-22 RX ADMIN — LIDOCAINE HYDROCHLORIDE 0.9 ML: 10 INJECTION, SOLUTION EPIDURAL; INFILTRATION; INTRACAUDAL; PERINEURAL at 21:41

## 2020-04-23 ENCOUNTER — EPISODE CHANGES (OUTPATIENT)
Dept: CASE MANAGEMENT | Facility: OTHER | Age: 40
End: 2020-04-23

## 2020-04-23 NOTE — ED PROVIDER NOTES
Subjective   Louise Martinez is a 39 y.o. female who presents to the ED with complaints of dysuria for the past week. Additionally, she endorses urinary frequency, urgency, pelvic pain, pelvic pressure, vaginal discharge, and left suprapubic abdominal pain. However, she denies any fever, chills, vaginal bleeding, nausea, or vomiting. The patient states that she was diagnosed with a UTI and was prescribed Macrobid on 4/14/20 which she finished yesterday, however, she reports that she has not had any improvement in her symptoms. The patient reports that she is sexually active with multiple partners. Her LNMP was about 1 month ago. She denies any significant past medical history. Her past surgical history includes a bladder suspension. She does not have a OBGYN. There are no other acute complaints at this time.      History provided by:  Patient  Dysuria   Pain severity:  Mild  Onset quality:  Sudden  Duration:  1 week  Timing:  Constant  Progression:  Unchanged  Chronicity:  New  Relieved by:  Nothing  Worsened by:  Nothing  Ineffective treatments:  Antibiotics  Associated symptoms: abdominal pain (left suprapubic) and vaginal discharge    Associated symptoms: no fever, no nausea and no vomiting    Risk factors: sexually active        Review of Systems   Constitutional: Negative for chills and fever.   Respiratory: Negative for cough and shortness of breath.    Cardiovascular: Negative for chest pain.   Gastrointestinal: Positive for abdominal pain (left suprapubic). Negative for nausea and vomiting.   Genitourinary: Positive for dysuria, frequency, pelvic pain, urgency and vaginal discharge. Negative for vaginal bleeding.   All other systems reviewed and are negative.      History reviewed. No pertinent past medical history.    Allergies   Allergen Reactions   • Sulfa Antibiotics Hives   • Amoxicillin Rash       Past Surgical History:   Procedure Laterality Date   • BLADDER SUSPENSION         History reviewed. No  pertinent family history.    Social History     Socioeconomic History   • Marital status: Single     Spouse name: Not on file   • Number of children: Not on file   • Years of education: Not on file   • Highest education level: Not on file   Tobacco Use   • Smoking status: Current Every Day Smoker     Packs/day: 0.50   • Smokeless tobacco: Never Used   Substance and Sexual Activity   • Alcohol use: Yes   • Drug use: No   • Sexual activity: Yes     Partners: Male, Female         Objective   Physical Exam   Constitutional: She is oriented to person, place, and time. She appears well-developed and well-nourished. She is cooperative.  Non-toxic appearance. No distress.   HENT:   Head: Normocephalic and atraumatic.   Eyes: Pupils are equal, round, and reactive to light. Conjunctivae, EOM and lids are normal.   Neck: Trachea normal, normal range of motion and full passive range of motion without pain.   Cardiovascular: Regular rhythm, normal heart sounds, intact distal pulses and normal pulses.   Pulmonary/Chest: Effort normal and breath sounds normal. No respiratory distress. She has no decreased breath sounds. She has no wheezes. She has no rhonchi. She has no rales.   Abdominal: Soft. Normal appearance and bowel sounds are normal. There is tenderness (suprapubic).   Genitourinary: Cervix exhibits discharge. Cervix exhibits no motion tenderness. No bleeding in the vagina. Vaginal discharge found.   Genitourinary Comments: Chaperone at bedside   Musculoskeletal: Normal range of motion.   Neurological: She is alert and oriented to person, place, and time. She has normal strength. No cranial nerve deficit.   Skin: Skin is warm, dry and intact. No rash noted.   Psychiatric: She has a normal mood and affect. Her speech is normal and behavior is normal.   Nursing note and vitals reviewed.      Procedures         ED Course  ED Course as of Apr 23 0225 Wed Apr 22, 2020 2055 Leukocytes, UA(!): Small (1+) [KG]   2146 Clue  Cells, Wet Prep(!): 2+ Clue cells seen [KG]   2147 WBC'S(!): 4+ WBC's seen [KG]   2200 Patient will be empirically treated for STDs.  Patient will be treated for BV.  Patient to follow-up with PCP as needed.  Patient to follow-up with GYN.  Patient agrees with treatment plan and verbalized understanding.    [KG]   2211 WBC'S(!): 4+ WBC's seen [RS]   2211 Clue Cells, Wet Prep(!): 2+ Clue cells seen [RS]      ED Course User Index  [KG] Daisy Roy, APRN  [RS] Art Vang MD      Recent Results (from the past 24 hour(s))   Urinalysis With Microscopic If Indicated (No Culture) - Urine, Clean Catch    Collection Time: 04/22/20  8:35 PM   Result Value Ref Range    Color, UA Dark Yellow (A) Yellow, Straw    Appearance, UA Clear Clear    pH, UA 5.5 5.0 - 8.0    Specific Gravity, UA 1.019 1.001 - 1.030    Glucose, UA Negative Negative    Ketones, UA Trace (A) Negative    Bilirubin, UA Negative Negative    Blood, UA Negative Negative    Protein, UA Negative Negative    Leuk Esterase, UA Small (1+) (A) Negative    Nitrite, UA Negative Negative    Urobilinogen, UA 1.0 E.U./dL 0.2 - 1.0 E.U./dL   Urinalysis, Microscopic Only - Urine, Clean Catch    Collection Time: 04/22/20  8:35 PM   Result Value Ref Range    RBC, UA 0-2 None Seen, 0-2 /HPF    WBC, UA 6-12 (A) None Seen, 0-2 /HPF    Bacteria, UA None Seen None Seen, Trace /HPF    Squamous Epithelial Cells, UA 0-2 None Seen, 0-2 /HPF    Hyaline Casts, UA 0-6 0 - 6 /LPF    Methodology Automated Microscopy    POCT Pregnancy, Urine    Collection Time: 04/22/20  8:37 PM   Result Value Ref Range    HCG, Urine, QL Negative Negative    Lot Number [id number]     Internal Positive Control Positive     Internal Negative Control Negative    Comprehensive Metabolic Panel    Collection Time: 04/22/20  8:38 PM   Result Value Ref Range    Glucose 112 (H) 65 - 99 mg/dL    BUN 13 6 - 20 mg/dL    Creatinine 0.87 0.57 - 1.00 mg/dL    Sodium 138 136 - 145 mmol/L    Potassium 3.6  3.5 - 5.2 mmol/L    Chloride 97 (L) 98 - 107 mmol/L    CO2 25.0 22.0 - 29.0 mmol/L    Calcium 9.7 8.6 - 10.5 mg/dL    Total Protein 8.0 6.0 - 8.5 g/dL    Albumin 4.80 3.50 - 5.20 g/dL    ALT (SGPT) 7 1 - 33 U/L    AST (SGOT) 17 1 - 32 U/L    Alkaline Phosphatase 61 39 - 117 U/L    Total Bilirubin 0.3 0.2 - 1.2 mg/dL    eGFR Non African Amer 73 >60 mL/min/1.73    Globulin 3.2 gm/dL    A/G Ratio 1.5 g/dL    BUN/Creatinine Ratio 14.9 7.0 - 25.0    Anion Gap 16.0 (H) 5.0 - 15.0 mmol/L   CBC Auto Differential    Collection Time: 04/22/20  8:38 PM   Result Value Ref Range    WBC 7.98 3.40 - 10.80 10*3/mm3    RBC 4.91 3.77 - 5.28 10*6/mm3    Hemoglobin 10.6 (L) 12.0 - 15.9 g/dL    Hematocrit 35.6 34.0 - 46.6 %    MCV 72.5 (L) 79.0 - 97.0 fL    MCH 21.6 (L) 26.6 - 33.0 pg    MCHC 29.8 (L) 31.5 - 35.7 g/dL    RDW 16.7 (H) 12.3 - 15.4 %    RDW-SD 43.2 37.0 - 54.0 fl    MPV 9.4 6.0 - 12.0 fL    Platelets 744 (H) 140 - 450 10*3/mm3    Neutrophil % 42.8 42.7 - 76.0 %    Lymphocyte % 51.0 (H) 19.6 - 45.3 %    Monocyte % 4.8 (L) 5.0 - 12.0 %    Eosinophil % 0.5 0.3 - 6.2 %    Basophil % 0.8 0.0 - 1.5 %    Immature Grans % 0.1 0.0 - 0.5 %    Neutrophils, Absolute 3.42 1.70 - 7.00 10*3/mm3    Lymphocytes, Absolute 4.07 (H) 0.70 - 3.10 10*3/mm3    Monocytes, Absolute 0.38 0.10 - 0.90 10*3/mm3    Eosinophils, Absolute 0.04 0.00 - 0.40 10*3/mm3    Basophils, Absolute 0.06 0.00 - 0.20 10*3/mm3    Immature Grans, Absolute 0.01 0.00 - 0.05 10*3/mm3    nRBC 0.0 0.0 - 0.2 /100 WBC   EVELYN Prep - Swab, Vagina    Collection Time: 04/22/20  9:01 PM   Result Value Ref Range    KOH Prep No yeast or hyphal elements seen No yeast or hyphal elements seen   Wet Prep, Genital - Swab, Vagina    Collection Time: 04/22/20  9:01 PM   Result Value Ref Range    YEAST No yeast seen No yeast seen    HYPHAL ELEMENTS No Hyphal elements seen No Hyphal elements seen    WBC'S 4+ WBC's seen (A) No WBC's seen    Clue Cells, Wet Prep 2+ Clue cells seen (A) No Clue  cells seen    Trichomonas, Wet Prep No Trichomonas seen No Trichomonas seen     Note: In addition to lab results from this visit, the labs listed above may include labs taken at another facility or during a different encounter within the last 24 hours. Please correlate lab times with ED admission and discharge times for further clarification of the services performed during this visit.    US Testicular or Ovarian Vascular Limited   Final Result      1. Unremarkable appearance of the ovaries, both of which demonstrate good flow at the time of the study.   2. The uterus is enlarged. Endometrial stripe measures in the normal range and an intrauterine device is present.   3. Prominent posterior mid uterine fibroid measuring 6.1 cm.       Signer Name: Jayy Smith MD    Signed: 4/22/2020 9:49 PM    Workstation Name: "[email or web address]My [employer]     [facility] Non-ob Transvaginal   Final Result      1. Unremarkable appearance of the ovaries, both of which demonstrate good flow at the time of the study.   2. The uterus is enlarged. Endometrial stripe measures in the normal range and an intrauterine device is present.   3. Prominent posterior mid uterine fibroid measuring 6.1 cm.       Signer Name: Jayy Smith MD    Signed: 4/22/2020 9:49 PM    Workstation Name: [email or web address]     [facility]        Vitals:    04/22/20 2111 04/22/20 2145 04/22/20 2200 04/22/20 2210   BP:   119/80 119/80   BP Location:    Left arm   Patient Position:    Sitting   Pulse: 92 89 79 84   Resp:    16   Temp:       TempSrc:       SpO2: 98%   98%   Weight:       Height:         Medications   sodium chloride 0.9 % bolus 1,000 mL (0 mL Intravenous Stopped 4/22/20 2142)   cefTRIAXone (ROCEPHIN) injection 250 mg (250 mg Intramuscular Given 4/22/20 2141)   azithromycin (ZITHROMAX) tablet 1,000 mg (1,000 mg Oral Given 4/22/20 2140)   lidocaine PF 1% (XYLOCAINE) injection 10 mL (0.9 mL Infiltration Given 4/22/20  2145)     ECG/EMG Results (last 24 hours)     ** No results found for the last 24 hours. **        No orders to display         COVID-19 RISK SCREEN     1. Has the patient had close contact without PPE with a lab confirmed COVID-19 (+) person or a person under investigation (PUI) for COVID-19 infection?  -- No     2. Has the patient had respiratory symptoms, worsened/new cough and/or SOA, unexplained fever, or sudden loss of smell and/or taste in the past 7 days? --  No    3. Does the patient have baseline higher exposure risk such as working in healthcare field or currently residing in healthcare facility?  --  No                 MDM    Final diagnoses:   Pelvic pain   Dysuria   Possible exposure to STD   Uterine leiomyoma, unspecified location   Bacterial vaginosis       Documentation assistance provided by garima Martin.  Information recorded by the scribe was done at my direction and has been verified and validated by me.     Lynette Martin  04/22/20 1052       Daisy Roy, APRN  04/23/20 [id number]

## 2020-04-24 LAB
C TRACH RRNA SPEC DONR QL NAA+PROBE: NEGATIVE
N GONORRHOEA DNA SPEC QL NAA+PROBE: NEGATIVE

## 2020-11-24 ENCOUNTER — OFFICE VISIT (OUTPATIENT)
Dept: OBSTETRICS AND GYNECOLOGY | Facility: CLINIC | Age: 40
End: 2020-11-24

## 2020-11-24 VITALS
SYSTOLIC BLOOD PRESSURE: 122 MMHG | WEIGHT: 162 LBS | BODY MASS INDEX: 27.66 KG/M2 | HEIGHT: 64 IN | DIASTOLIC BLOOD PRESSURE: 87 MMHG

## 2020-11-24 DIAGNOSIS — R59.0 LYMPHADENOPATHY, INGUINAL: ICD-10-CM

## 2020-11-24 DIAGNOSIS — Z30.432 ENCOUNTER FOR IUD REMOVAL: Primary | ICD-10-CM

## 2020-11-24 PROCEDURE — 99213 OFFICE O/P EST LOW 20 MIN: CPT | Performed by: NURSE PRACTITIONER

## 2020-11-24 PROCEDURE — 58301 REMOVE INTRAUTERINE DEVICE: CPT | Performed by: NURSE PRACTITIONER

## 2020-11-24 NOTE — PROGRESS NOTES
IUD Removal Procedure Note    Procedures    Type of IUD:  ParaGard   Date of insertion:  January 2005  Reason for removal:  Device expiration    Procedure Time Out Documentation    Procedure Details  IUD strings visible:  yes  Removal:  IUD strings grasped and IUD removed intact with gentle traction.  The patient tolerated the procedure well.    All appropriate instructions regarding removal were reviewed.    Tolerated well  No apparent complications  Post procedure diagnosis : IUD removal     Plans for contraception:  no method    The patient was advised to call for any fever or for prolonged or severe pain or bleeding. She was advised to use motrin as needed for mild to moderate pain.     Lucía Rosales, APRN  11/24/2020

## 2020-11-24 NOTE — PROGRESS NOTES
"Chief Complaint   Patient presents with   • Follow-up     IUD removal       Subjective   HPI  Louies Martinez is a 40 y.o. female, , who presents for IUD removal, but has complaints today of enlarged lymph node.      Pt states that she noticed this many years ago, possibly . Pt states it is always there but does get larger at times, especially around the time of her period. It is minimally tender.     Pt states she went to the ER last year sometime and she had a CT scan that revealed an enlarged lymph node.     She still finds this worrisome and would like to have further evaluation.     Pt states that she has h/o substance abuse, has been sober for 9 months.     The additional following portions of the patient's history were reviewed and updated as appropriate: allergies, current medications, past family history, past medical history, past social history, past surgical history and problem list.    Review of Systems   Genitourinary:        Mass in right groin    All other systems reviewed and are negative.      I have reviewed and agree with the HPI, ROS, and historical information as entered above. Lucía Rosales, APRN    Objective   /87   Ht 162.6 cm (64\")   Wt 73.5 kg (162 lb)   LMP 10/30/2020   Breastfeeding No   BMI 27.81 kg/m²     Physical Exam  Constitutional:       Appearance: Normal appearance.   HENT:      Head: Normocephalic.   Pulmonary:      Effort: Pulmonary effort is normal.   Genitourinary:     Comments: Palpable 2 cm right inguinal mass, mobile   Lymphadenopathy:      Lower Body: Right inguinal adenopathy present.   Neurological:      Mental Status: She is alert and oriented to person, place, and time.   Psychiatric:         Behavior: Behavior normal.         Assessment/Plan     Assessment     Problem List Items Addressed This Visit     None      Visit Diagnoses     Encounter for IUD removal    -  Primary    Lymphadenopathy, inguinal        Relevant Orders    Ambulatory Referral to " General Surgery          Plan     1. See procedure note for IUD removal  2. Refer to Gen Surg to eval R inguinal mass, ?LAD, lipoma.       Lucía Rosales, APRN  11/24/2020

## 2020-12-26 ENCOUNTER — APPOINTMENT (OUTPATIENT)
Dept: PREADMISSION TESTING | Facility: HOSPITAL | Age: 40
End: 2020-12-26

## 2020-12-26 PROCEDURE — C9803 HOPD COVID-19 SPEC COLLECT: HCPCS

## 2020-12-26 PROCEDURE — U0004 COV-19 TEST NON-CDC HGH THRU: HCPCS

## 2020-12-27 ENCOUNTER — APPOINTMENT (OUTPATIENT)
Dept: PREADMISSION TESTING | Facility: HOSPITAL | Age: 40
End: 2020-12-27

## 2020-12-27 LAB — SARS-COV-2 RNA RESP QL NAA+PROBE: DETECTED

## 2020-12-28 NOTE — NURSING NOTE
Notified Dr. Lazcano on call for Dr. Lopez of the positive COVID. He will contact the patient and instruct to quarantine and contact PCP.  Temp 98.9 and veronica.  Ya at [facility] on 12/26/20

## 2021-05-01 ENCOUNTER — APPOINTMENT (OUTPATIENT)
Dept: CT IMAGING | Facility: HOSPITAL | Age: 41
End: 2021-05-01

## 2021-05-01 ENCOUNTER — HOSPITAL ENCOUNTER (EMERGENCY)
Facility: HOSPITAL | Age: 41
Discharge: HOME | End: 2021-05-01
Attending: EMERGENCY MEDICINE | Admitting: EMERGENCY MEDICINE

## 2021-05-01 VITALS
TEMPERATURE: 98.2 F | OXYGEN SATURATION: 100 % | DIASTOLIC BLOOD PRESSURE: 72 MMHG | HEART RATE: 103 BPM | WEIGHT: 160 LBS | SYSTOLIC BLOOD PRESSURE: 107 MMHG | BODY MASS INDEX: 27.31 KG/M2 | RESPIRATION RATE: 16 BRPM | HEIGHT: 64 IN

## 2021-05-01 DIAGNOSIS — S39.011A STRAIN OF MUSCLE, FASCIA AND TENDON OF ABDOMEN, INITIAL ENCOUNTER: ICD-10-CM

## 2021-05-01 DIAGNOSIS — D25.9 UTERINE LEIOMYOMA, UNSPECIFIED LOCATION: Primary | ICD-10-CM

## 2021-05-01 DIAGNOSIS — R10.30 LOWER ABDOMINAL PAIN: ICD-10-CM

## 2021-05-01 LAB
ALBUMIN SERPL-MCNC: 4.6 G/DL (ref 3.5–5.2)
ALBUMIN/GLOB SERPL: 2.1 G/DL
ALP SERPL-CCNC: 44 U/L (ref 39–117)
ALT SERPL W P-5'-P-CCNC: 8 U/L (ref 1–33)
ANION GAP SERPL CALCULATED.3IONS-SCNC: 9 MMOL/L (ref 5–15)
AST SERPL-CCNC: 20 U/L (ref 1–32)
B-HCG UR QL: NEGATIVE
BASOPHILS # BLD AUTO: 0.05 10*3/MM3 (ref 0–0.2)
BASOPHILS NFR BLD AUTO: 1.1 % (ref 0–1.5)
BILIRUB SERPL-MCNC: 0.3 MG/DL (ref 0–1.2)
BILIRUB UR QL STRIP: NEGATIVE
BUN SERPL-MCNC: 12 MG/DL (ref 6–20)
BUN/CREAT SERPL: 16.7 (ref 7–25)
CALCIUM SPEC-SCNC: 8.8 MG/DL (ref 8.6–10.5)
CHLORIDE SERPL-SCNC: 106 MMOL/L (ref 98–107)
CLARITY UR: CLEAR
CO2 SERPL-SCNC: 25 MMOL/L (ref 22–29)
COLOR UR: YELLOW
CREAT SERPL-MCNC: 0.72 MG/DL (ref 0.57–1)
DEPRECATED RDW RBC AUTO: 43.2 FL (ref 37–54)
EOSINOPHIL # BLD AUTO: 0.12 10*3/MM3 (ref 0–0.4)
EOSINOPHIL NFR BLD AUTO: 2.6 % (ref 0.3–6.2)
ERYTHROCYTE [DISTWIDTH] IN BLOOD BY AUTOMATED COUNT: 16.5 % (ref 12.3–15.4)
GFR SERPL CREATININE-BSD FRML MDRD: 90 ML/MIN/1.73
GLOBULIN UR ELPH-MCNC: 2.2 GM/DL
GLUCOSE SERPL-MCNC: 99 MG/DL (ref 65–99)
GLUCOSE UR STRIP-MCNC: NEGATIVE MG/DL
HCT VFR BLD AUTO: 31.8 % (ref 34–46.6)
HGB BLD-MCNC: 9.2 G/DL (ref 12–15.9)
HGB UR QL STRIP.AUTO: NEGATIVE
HOLD SPECIMEN: NORMAL
IMM GRANULOCYTES # BLD AUTO: 0.02 10*3/MM3 (ref 0–0.05)
IMM GRANULOCYTES NFR BLD AUTO: 0.4 % (ref 0–0.5)
INTERNAL NEGATIVE CONTROL: NEGATIVE
INTERNAL POSITIVE CONTROL: POSITIVE
KETONES UR QL STRIP: NEGATIVE
LEUKOCYTE ESTERASE UR QL STRIP.AUTO: NEGATIVE
LIPASE SERPL-CCNC: 28 U/L (ref 13–60)
LYMPHOCYTES # BLD AUTO: 2.24 10*3/MM3 (ref 0.7–3.1)
LYMPHOCYTES NFR BLD AUTO: 48.8 % (ref 19.6–45.3)
Lab: NORMAL
MCH RBC QN AUTO: 21.2 PG (ref 26.6–33)
MCHC RBC AUTO-ENTMCNC: 28.9 G/DL (ref 31.5–35.7)
MCV RBC AUTO: 73.3 FL (ref 79–97)
MONOCYTES # BLD AUTO: 0.42 10*3/MM3 (ref 0.1–0.9)
MONOCYTES NFR BLD AUTO: 9.2 % (ref 5–12)
NEUTROPHILS NFR BLD AUTO: 1.74 10*3/MM3 (ref 1.7–7)
NEUTROPHILS NFR BLD AUTO: 37.9 % (ref 42.7–76)
NITRITE UR QL STRIP: NEGATIVE
NRBC BLD AUTO-RTO: 0 /100 WBC (ref 0–0.2)
PH UR STRIP.AUTO: 6 [PH] (ref 5–8)
PLATELET # BLD AUTO: 439 10*3/MM3 (ref 140–450)
PMV BLD AUTO: 10.1 FL (ref 6–12)
POTASSIUM SERPL-SCNC: 4.1 MMOL/L (ref 3.5–5.2)
PROT SERPL-MCNC: 6.8 G/DL (ref 6–8.5)
PROT UR QL STRIP: NEGATIVE
RBC # BLD AUTO: 4.34 10*6/MM3 (ref 3.77–5.28)
SODIUM SERPL-SCNC: 140 MMOL/L (ref 136–145)
SP GR UR STRIP: 1.02 (ref 1–1.03)
UROBILINOGEN UR QL STRIP: NORMAL
WBC # BLD AUTO: 4.59 10*3/MM3 (ref 3.4–10.8)
WHOLE BLOOD HOLD SPECIMEN: NORMAL
WHOLE BLOOD HOLD SPECIMEN: NORMAL

## 2021-05-01 PROCEDURE — 81003 URINALYSIS AUTO W/O SCOPE: CPT | Performed by: EMERGENCY MEDICINE

## 2021-05-01 PROCEDURE — 80053 COMPREHEN METABOLIC PANEL: CPT | Performed by: EMERGENCY MEDICINE

## 2021-05-01 PROCEDURE — 83690 ASSAY OF LIPASE: CPT | Performed by: EMERGENCY MEDICINE

## 2021-05-01 PROCEDURE — 85025 COMPLETE CBC W/AUTO DIFF WBC: CPT | Performed by: EMERGENCY MEDICINE

## 2021-05-01 PROCEDURE — 74176 CT ABD & PELVIS W/O CONTRAST: CPT

## 2021-05-01 PROCEDURE — 81025 URINE PREGNANCY TEST: CPT | Performed by: EMERGENCY MEDICINE

## 2021-05-01 PROCEDURE — 99283 EMERGENCY DEPT VISIT LOW MDM: CPT

## 2021-05-01 RX ORDER — SODIUM CHLORIDE 9 MG/ML
10 INJECTION INTRAVENOUS AS NEEDED
Status: DISCONTINUED | OUTPATIENT
Start: 2021-05-01 | End: 2021-05-01 | Stop reason: HOSPADM

## 2021-05-01 RX ADMIN — SODIUM CHLORIDE 1000 ML: 9 INJECTION, SOLUTION INTRAVENOUS at 10:20

## 2021-05-01 NOTE — ED PROVIDER NOTES
Subjective   40-year-old female presents with complaint of right lower quad abdominal pain.  The patient reports that she did a spin class yesterday morning was sore over, but does not remember being sore in the abdomen that given time.  She states that she went to a concert last night and after a cough she began to have right lower quadrant abdominal pain.  The pain is isolated to the right lower quadrant/suprapubic region.  It does not radiate into the flank.  No previous surgical intervention to the abdomen.  She denies any chest pain, cough, shortness of breath, sore throat, rhinorrhea, change in sense of taste or smell.  She was diagnosed with COVID-19 in December.  She reports a history of frequent urinary tract infections the past no history of kidney stones.  She appears well, nontoxic and in no acute distress.          Review of Systems   Constitutional: Negative for chills, fatigue and fever.   HENT: Negative for congestion, ear pain, postnasal drip, sinus pressure and sore throat.    Eyes: Negative for pain, redness and visual disturbance.   Respiratory: Negative for cough, chest tightness and shortness of breath.    Cardiovascular: Negative for chest pain, palpitations and leg swelling.   Gastrointestinal: Positive for abdominal pain. Negative for anal bleeding, blood in stool, diarrhea, nausea and vomiting.   Endocrine: Negative for polydipsia and polyuria.   Genitourinary: Negative for difficulty urinating, dysuria, frequency and urgency.   Musculoskeletal: Negative for arthralgias, back pain and neck pain.   Skin: Negative for pallor and rash.   Allergic/Immunologic: Negative for environmental allergies and immunocompromised state.   Neurological: Negative for dizziness, weakness and headaches.   Hematological: Negative for adenopathy.   Psychiatric/Behavioral: Negative for confusion, self-injury and suicidal ideas. The patient is not nervous/anxious.    All other systems reviewed and are  negative.      Past Medical History:   Diagnosis Date   • Abnormal Pap smear of cervix     reviewed--5/6/2020-mild dysplasia, HPV-   • Anxiety    • Condition not found     SEXUAL ABUSE   • Depression    • History of drug abuse (CMS/[facility])    • IUD (intrauterine device) in place 2004    Paraguard   • Papanicolaou smear 2018 5/6/2020-negative   • Substance abuse (CMS/[facility])     marijuana, opiates       Allergies   Allergen Reactions   • Sulfa Antibiotics Hives   • Amoxicillin Rash       Past Surgical History:   Procedure Laterality Date   • BLADDER SUSPENSION  2012    bladder sling       Family History   Problem Relation Age of Onset   • Asthma Mother    • Alcohol abuse Mother    • Diabetes Father    • Hypertension Father    • Alcohol abuse Father    • Alcohol abuse Sister    • Alcohol abuse Brother    • Alcohol abuse Maternal Grandmother    • Diabetes Maternal Grandfather    • Alcohol abuse Paternal Grandfather    • Anxiety disorder Other    • Depression Other        Social History     Socioeconomic History   • Marital status: Single     Spouse name: Not on file   • Number of children: Not on file   • Years of education: Not on file   • Highest education level: Not on file   Tobacco Use   • Smoking status: Former Smoker   • Smokeless tobacco: Never Used   Substance and Sexual Activity   • Alcohol use: Not Currently   • Drug use: Not Currently     Comment: Opiates   • Sexual activity: Yes     Partners: Female     Birth control/protection: None           Objective   Physical Exam  Vitals and nursing note reviewed.   Constitutional:       General: She is not in acute distress.     Appearance: Normal appearance. She is well-developed. She is not toxic-appearing or diaphoretic.   HENT:      Head: Normocephalic and atraumatic.      Right Ear: External ear normal.      Left Ear: External ear normal.      Nose: Nose normal.   Eyes:      General: Lids are normal.      Pupils: Pupils are equal, round, and reactive to light.    Neck:      Trachea: No tracheal deviation.   Cardiovascular:      Rate and Rhythm: Normal rate and regular rhythm.      Pulses: No decreased pulses.      Heart sounds: Normal heart sounds. No murmur heard.   No friction rub. No gallop.    Pulmonary:      Effort: Pulmonary effort is normal. No respiratory distress.      Breath sounds: Normal breath sounds. No decreased breath sounds, wheezing, rhonchi or rales.   Abdominal:      General: Bowel sounds are normal.      Palpations: Abdomen is soft.      Tenderness: There is no abdominal tenderness. There is no guarding or rebound.       Musculoskeletal:         General: No deformity. Normal range of motion.      Cervical back: Normal range of motion and neck supple.   Lymphadenopathy:      Cervical: No cervical adenopathy.   Skin:     General: Skin is warm and dry.      Findings: No rash.   Neurological:      Mental Status: She is alert and oriented to person, place, and time.      Cranial Nerves: No cranial nerve deficit.      Sensory: No sensory deficit.   Psychiatric:         Speech: Speech normal.         Behavior: Behavior normal.         Thought Content: Thought content normal.         Judgment: Judgment normal.         Procedures           ED Course                                           MDM  Number of Diagnoses or Management Options  Lower abdominal pain: new and requires workup  Strain of muscle, fascia and tendon of abdomen, initial encounter: new and requires workup  Uterine leiomyoma, unspecified location: new and requires workup  Diagnosis management comments: CT scan shows uterine fibroids are unchanged in nature.  No other acute pathology defined on CT scan of the abdomen pelvis.      The patient's previous visit on April 22, 2020 was reviewed, including lab imaging and no evaluation.    I feel muscle strain most likely contributed to this current acute presentation.  However the patient has underlying uterine fibroids which could have contributed to  the pain.    Exam and story are not concerning for acute appendicitis.  CT scan of the abdomen pelvis does not show acute appendicitis.    Patient be discharged with the advised to observe symptoms, rest, and drink plenty of fluids.  Take Tylenol or ibuprofen as needed to help with pain.           Amount and/or Complexity of Data Reviewed  Clinical lab tests: ordered and reviewed  Tests in the radiology section of CPT®: ordered and reviewed  Review and summarize past medical records: yes  Independent visualization of images, tracings, or specimens: yes        Final diagnoses:   Uterine leiomyoma, unspecified location   Lower abdominal pain   Strain of muscle, fascia and tendon of abdomen, initial encounter       ED Disposition  ED Disposition     ED Disposition Condition Comment    Discharge Stable           Provider, No Known  [facility] [id number]    In 1 week           Medication List      No changes were made to your prescriptions during this visit.          Abigail Goodwin MD  05/01/21 [id number]

## 2021-05-01 NOTE — DISCHARGE INSTRUCTIONS
Take Tylenol or ibuprofen as needed to help with pain.    Make sure to drink plenty of water.    Follow-up with your gynecologist for further outpatient evaluation.    Return to the ER with any further concern.

## 2021-05-05 ENCOUNTER — OFFICE VISIT (OUTPATIENT)
Dept: OBSTETRICS AND GYNECOLOGY | Facility: CLINIC | Age: 41
End: 2021-05-05

## 2021-05-05 VITALS
BODY MASS INDEX: 28.2 KG/M2 | HEIGHT: 64 IN | WEIGHT: 165.2 LBS | DIASTOLIC BLOOD PRESSURE: 72 MMHG | SYSTOLIC BLOOD PRESSURE: 116 MMHG

## 2021-05-05 DIAGNOSIS — R19.09 RIGHT GROIN MASS: Primary | ICD-10-CM

## 2021-05-05 DIAGNOSIS — D25.1 INTRAMURAL LEIOMYOMA OF UTERUS: ICD-10-CM

## 2021-05-05 PROCEDURE — 99213 OFFICE O/P EST LOW 20 MIN: CPT | Performed by: OBSTETRICS & GYNECOLOGY

## 2021-05-05 RX ORDER — IBUPROFEN 800 MG/1
800 TABLET ORAL 4 TIMES DAILY PRN
COMMUNITY
Start: 2021-02-14

## 2021-05-05 NOTE — PROGRESS NOTES
Chief Complaint   Patient presents with   • Gynecologic Exam   • Follow-up       Subjective   HPI  Louise Martinez is a 40 y.o. female, , who presents for gynecology exam, ED follow up from 2021 for lower abdominal pain, CT scan was performed and recommended patient to follow up with GYN provider. She was found to have a 6cm uterine fibroid per CT and US.     She states she has experienced this problem for 5 days.  She describes the severity as moderate.  She states that the problem is intermittent.  The patient reports additional symptoms as none.  She describes back pain as well as groin pain.     Her labs were reviewed from prior ED visit  Her US/CT were reviewed from prior ED visit.     Her last LMP was Patient's last menstrual period was 2021..  Periods are regular every 28-30 days, lasting 7 days.  Dysmenorrhea:mild, occurring premenstrually and first 1-2 days of flow.  Patient reports problems with: none.  Partner Status: Marital Status: single.  New Partners since last visit: no.  Desires STD Screening: no.    Additional OB/GYN History   Current contraception: contraceptive methods: None  Desires to: do not start contraception  Last Pap : 2020 - HPV + non 16/18 Maria L recommended pt to repeat in 6 months, patient was unable to reach and never came for 6 month follow up.  Last Completed Pap Smear     This patient has no relevant Health Maintenance data.        History of abnormal Pap smear: yes - HPV + non 16/18  Last mammogram: never  Last Completed Mammogram     This patient has no relevant Health Maintenance data.        Tobacco Usage?: No   OB History        2    Para        Term                AB        Living   2       SAB        TAB        Ectopic        Molar        Multiple        Live Births                    Health Maintenance   Topic Date Due   • Annual Gynecologic Pelvic and Breast Exam  Never done   • ANNUAL PHYSICAL  Never done   • TDAP/TD VACCINES (1 -  "Tdap) Never done   • HEPATITIS C SCREENING  Never done   • PAP SMEAR  Never done   • COVID-19 Vaccine (2 - Pfizer 2-dose series) 04/20/2021   • INFLUENZA VACCINE  08/01/2021   • Pneumococcal Vaccine 0-64  Aged Out       The additional following portions of the patient's history were reviewed and updated as appropriate: allergies, current medications, past family history, past medical history, past social history, past surgical history and problem list.    Review of Systems   Constitutional: Negative for activity change, appetite change, unexpected weight gain and unexpected weight loss.   Eyes: Negative for visual disturbance.   Respiratory: Negative for cough and shortness of breath.    Cardiovascular: Negative for chest pain.   Gastrointestinal: Negative for abdominal distention, abdominal pain, nausea and vomiting.   Genitourinary: Positive for pelvic pain. Negative for breast discharge, breast lump, breast pain and menstrual problem.   Musculoskeletal: Negative for back pain.   Neurological: Negative for light-headedness and headache.   Psychiatric/Behavioral: Negative for agitation and depressed mood.       I have reviewed and agree with the HPI, ROS, and historical information as entered above. Hari Wall MD    Objective   /72   Ht 162.6 cm (64.02\")   Wt 74.9 kg (165 lb 3.2 oz)   LMP 04/19/2021   Breastfeeding No   BMI 28.34 kg/m²     Physical Exam  Vitals reviewed. Exam conducted with a chaperone present.   Constitutional:       Appearance: Normal appearance. She is normal weight.   HENT:      Head: Normocephalic and atraumatic.   Cardiovascular:      Rate and Rhythm: Normal rate and regular rhythm.   Pulmonary:      Effort: Pulmonary effort is normal.      Breath sounds: Normal breath sounds.   Abdominal:      General: Abdomen is flat. Bowel sounds are normal.      Palpations: Abdomen is soft.      Hernia: A hernia is present. Hernia is present in the right inguinal area (hernia vs " mass).   Genitourinary:     General: Normal vulva.      Vagina: Normal.      Cervix: Normal.      Uterus: Enlarged.       Adnexa: Right adnexa normal and left adnexa normal.      Rectum: Normal.   Skin:     General: Skin is warm.   Neurological:      Mental Status: She is alert and oriented to person, place, and time.   Psychiatric:         Mood and Affect: Mood normal.         Behavior: Behavior normal.         Assessment/Plan     Assessment     Problem List Items Addressed This Visit     None      Visit Diagnoses     Intramural leiomyoma of uterus    -  Primary    Relevant Orders    US Non-ob Transvaginal    Right groin mass        Relevant Orders    US Soft Tissue            Plan   1.  Groin mass- likely not associated with fibroid. Suspect hernia- will plan for US evaluation.   2.  Uterine fibroid- discussed options including hysterectomy. Will consider and repeat imaging in 1 month.     Hari Wall MD  05/05/2021

## 2021-05-05 NOTE — PATIENT INSTRUCTIONS
Uterine Fibroids    Uterine fibroids (leiomyomas) are noncancerous (benign) tumors that can develop in the uterus. Fibroids may also develop in the fallopian tubes, cervix, or tissues (ligaments) near the uterus.  You may have one or many fibroids. Fibroids vary in size, weight, and where they grow in the uterus. Some can become quite large. Most fibroids do not require medical treatment.  What are the causes?  The cause of this condition is not known.  What increases the risk?  You are more likely to develop this condition if you:  · Are in your 30s or 40s and have not gone through menopause.  · Have a family history of this condition.  · Are of -American descent.  · Had your first period at an early age (early menarche).  · Have not had any children (nulliparity).  · Are overweight or obese.  What are the signs or symptoms?  Many women do not have any symptoms. Symptoms of this condition may include:  · Heavy menstrual bleeding.  · Bleeding or spotting between periods.  · Pain and pressure in the pelvic area, between the hips.  · Bladder problems, such as needing to urinate urgently or more often than usual.  · Inability to have children (infertility).  · Failure to carry pregnancy to term (miscarriage).  How is this diagnosed?  This condition may be diagnosed based on:  · Your symptoms and medical history.  · A physical exam.  · A pelvic exam that includes feeling for any tumors.  · Imaging tests, such as ultrasound or MRI.  How is this treated?  Treatment for this condition may include:  · Seeing your health care provider for follow-up visits to monitor your fibroids for any changes.  · Taking NSAIDs such as ibuprofen, naproxen, or aspirin to reduce pain.  · Hormone medicines. These may be taken as a pill, given in an injection, or delivered by a T-shaped device that is inserted into the uterus (intrauterine device, IUD).  · Surgery to remove one of the following:  ? The fibroids (myomectomy). Your health  care provider may recommend this if fibroids affect your fertility and you want to become pregnant.  ? The uterus (hysterectomy).  ? Blood supply to the fibroids (uterine artery embolization).  Follow these instructions at home:  · Take over-the-counter and prescription medicines only as told by your health care provider.  · Ask your health care provider if you should take iron pills or eat more iron-rich foods, such as dark green, leafy vegetables. Heavy menstrual bleeding can cause low iron levels.  · If directed, apply heat to your back or abdomen to reduce pain. Use the heat source that your health care provider recommends, such as a moist heat pack or a heating pad.  ? Place a towel between your skin and the heat source.  ? Leave the heat on for 20-30 minutes.  ? Remove the heat if your skin turns bright red. This is especially important if you are unable to feel pain, heat, or cold. You may have a greater risk of getting burned.  · Pay close attention to your menstrual cycle. Tell your health care provider about any changes, such as:  ? Increased blood flow that requires you to use more pads or tampons than usual.  ? A change in the number of days that your period lasts.  ? A change in symptoms that are associated with your period, such as back pain or cramps in your abdomen.  · Keep all follow-up visits as told by your health care provider. This is important, especially if your fibroids need to be monitored for any changes.  Contact a health care provider if you:  · Have pelvic pain, back pain, or cramps in your abdomen that do not get better with medicine or heat.  · Develop new bleeding between periods.  · Have increased bleeding during or between periods.  · Feel unusually tired or weak.  · Feel light-headed.  Get help right away if you:  · Faint.  · Have pelvic pain that suddenly gets worse.  · Have severe vaginal bleeding that soaks a tampon or pad in 30 minutes or less.  Summary  · Uterine fibroids are  noncancerous (benign) tumors that can develop in the uterus.  · The exact cause of this condition is not known.  · Most fibroids do not require medical treatment unless they affect your ability to have children (fertility).  · Contact a health care provider if you have pelvic pain, back pain, or cramps in your abdomen that do not get better with medicines.  · Make sure you know what symptoms should cause you to get help right away.  This information is not intended to replace advice given to you by your health care provider. Make sure you discuss any questions you have with your health care provider.  Document Revised: 11/30/2018 Document Reviewed: 11/13/2018  Elsevier Patient Education © 2021 [employer]

## 2021-05-27 ENCOUNTER — HOSPITAL ENCOUNTER (OUTPATIENT)
Dept: ULTRASOUND IMAGING | Facility: HOSPITAL | Age: 41
Discharge: HOME | End: 2021-05-27
Admitting: OBSTETRICS & GYNECOLOGY

## 2021-05-27 DIAGNOSIS — R19.09 RIGHT GROIN MASS: ICD-10-CM

## 2021-05-27 PROCEDURE — 76857 US EXAM PELVIC LIMITED: CPT

## 2021-06-04 ENCOUNTER — OFFICE VISIT (OUTPATIENT)
Dept: OBSTETRICS AND GYNECOLOGY | Facility: CLINIC | Age: 41
End: 2021-06-04

## 2021-06-04 VITALS
HEIGHT: 64 IN | DIASTOLIC BLOOD PRESSURE: 68 MMHG | SYSTOLIC BLOOD PRESSURE: 104 MMHG | BODY MASS INDEX: 28 KG/M2 | WEIGHT: 164 LBS

## 2021-06-04 DIAGNOSIS — D25.1 INTRAMURAL LEIOMYOMA OF UTERUS: Primary | ICD-10-CM

## 2021-06-04 DIAGNOSIS — R19.09 INGUINAL MASS: ICD-10-CM

## 2021-06-04 PROCEDURE — 99213 OFFICE O/P EST LOW 20 MIN: CPT | Performed by: OBSTETRICS & GYNECOLOGY

## 2021-06-04 NOTE — PROGRESS NOTES
Chief Complaint   Patient presents with   • Gynecologic Exam   • Fibroids     follow up       Subjective   HPI  Louise Martinez is a 40 y.o. female, , who presents for fibroid follow up with transvaginal ultrasound today.      She states she has experienced this problem for 2-3 weeks.  She describes the back pain severity as moderate-severe.  She states that the problem is intermittent.  The patient reports additional symptoms as none.      US consistent with large fibroids. We discussed findings and I recommend hysterectomy due to persistent back pain.      Also discussed findings of cystic lesion in the right inguinal region.  These findings discussed and I recommend she be evaluated by general surgery.     Her last LMP was 2021. Periods are regular every 28-30 days, lasting 7-8 days.  Dysmenorrhea:none.  Patient reports problems with: none.  Partner Status: Marital Status: single.  New Partners since last visit: yes.  Desires STD Screening: no.    Additional OB/GYN History   Current contraception: contraceptive methods: None  Desires to: do not start contraception  Last Pap : 2020 - negative per pt.   Last Completed Pap Smear     This patient has no relevant Health Maintenance data.        History of abnormal Pap smear: yes  Last mammogram: never  Last Completed Mammogram     This patient has no relevant Health Maintenance data.        Tobacco Usage?: No   OB History        2    Para        Term                AB        Living   2       SAB        TAB        Ectopic        Molar        Multiple        Live Births                    Health Maintenance   Topic Date Due   • Annual Gynecologic Pelvic and Breast Exam  Never done   • ANNUAL PHYSICAL  Never done   • TDAP/TD VACCINES (1 - Tdap) Never done   • HEPATITIS C SCREENING  Never done   • PAP SMEAR  Never done   • COVID-19 Vaccine (2 - Pfizer 2-dose series) 2021   • INFLUENZA VACCINE  2021   • Pneumococcal Vaccine 0-64  Aged  "Out       The additional following portions of the patient's history were reviewed and updated as appropriate: allergies, current medications, past family history, past medical history, past social history, past surgical history and problem list.    Review of Systems   Constitutional: Negative for activity change, appetite change, unexpected weight gain and unexpected weight loss.   Respiratory: Negative for cough and shortness of breath.    Cardiovascular: Negative for chest pain and palpitations.   Gastrointestinal: Negative for abdominal pain, nausea and vomiting.   Genitourinary: Positive for pelvic pain. Negative for menstrual problem.   Neurological: Negative for light-headedness and headache.   Psychiatric/Behavioral: Negative for agitation, behavioral problems, decreased concentration and depressed mood.       I have reviewed and agree with the HPI, ROS, and historical information as entered above. Hari Wall MD    Objective   /68   Ht 162.6 cm (64.02\")   Wt 74.4 kg (164 lb)   LMP 05/17/2021   Breastfeeding No   BMI 28.14 kg/m²     Physical Exam  Constitutional:       Appearance: Normal appearance. She is normal weight.   Cardiovascular:      Rate and Rhythm: Normal rate and regular rhythm.   Abdominal:      General: Abdomen is flat. Bowel sounds are normal.      Palpations: Abdomen is soft.   Skin:     General: Skin is warm and dry.   Neurological:      Mental Status: She is alert and oriented to person, place, and time.   Psychiatric:         Mood and Affect: Mood normal.         Behavior: Behavior normal.         Assessment/Plan     Assessment     Problem List Items Addressed This Visit     None      Visit Diagnoses     Intramural leiomyoma of uterus    -  Primary    Inguinal mass        Relevant Orders    Ambulatory Referral to General Surgery (Completed)          Plan     Return in about 4 weeks (around 7/2/2021) for Annual physical and pap smear.   Discussed treatment options for " uterine fibroids and she would like to think about it. Will discuss at f/u visit.   Will refer to Dr. Anne for further evaluation of inguinal cyst.     Hari Wall MD  06/04/2021

## 2021-06-04 NOTE — PATIENT INSTRUCTIONS
Uterine Fibroids    Uterine fibroids are lumps of tissue (tumors) in your womb (uterus). They are not cancer (are benign).  Most women with this condition do not need treatment. Sometimes fibroids can affect your ability to have children (your fertility). If that happens, you may need surgery to take out the fibroids.  Follow these instructions at home:  · Take over-the-counter and prescription medicines only as told by your doctor. Your doctor may suggest NSAIDs (such as aspirin or ibuprofen) to help with pain.  · Ask your doctor if you should:  ? Take iron pills.  ? Eat more foods that have iron in them, such as dark green, leafy vegetables.  · If directed, apply heat to your back or belly to reduce pain. Use the heat source that your doctor recommends, such as a moist heat pack or a heating pad.  ? Put a towel between your skin and the heat source.  ? Leave the heat on for 20-30 minutes.  ? Remove the heat if your skin turns bright red. This is especially important if you are unable to feel pain, heat, or cold. You may have a greater risk of getting burned.  · Pay close attention to your period (menstrual) cycles. Tell your doctor about any changes, such as:  ? A heavier blood flow than usual.  ? Needing to use more pads or tampons than normal.  ? A change in how many days your period lasts.  ? A change in symptoms that come with your period, such as cramps or back pain.  · Keep all follow-up visits as told by your doctor. This is important. Your doctor may need to watch your fibroids over time for any changes.  Contact a doctor if you:  · Have pain that does not get better with medicine or heat, such as pain or cramps in:  ? Your back.  ? The area between your hip bones (pelvic area).  ? Your belly.  · Have new bleeding between your periods.  · Have more bleeding during or between your periods.  · Feel very tired or weak.  · Feel light-headed.  Get help right away if you:  · Pass out (faint).  · Have pain in the  area between your hip bones that suddenly gets worse.  · Have bleeding that soaks a tampon or pad in 30 minutes or less.  Summary  · Uterine fibroids are lumps of tissue (tumors) in your womb (uterus). They are not cancer.  · The only treatment that most women need is taking aspirin or ibuprofen for pain.  · Contact a doctor if you have pain or cramps that do not get better with medicine.  · Make sure you know what symptoms you should get help for right away.  This information is not intended to replace advice given to you by your health care provider. Make sure you discuss any questions you have with your health care provider.  Document Revised: 11/30/2018 Document Reviewed: 11/13/2018  [employer] Patient Education © 2021 [employer]

## 2022-08-22 ENCOUNTER — APPOINTMENT (OUTPATIENT)
Dept: GENERAL RADIOLOGY | Facility: HOSPITAL | Age: 42
End: 2022-08-22

## 2022-08-22 ENCOUNTER — HOSPITAL ENCOUNTER (EMERGENCY)
Facility: HOSPITAL | Age: 42
Discharge: HOME | End: 2022-08-22
Attending: EMERGENCY MEDICINE | Admitting: EMERGENCY MEDICINE

## 2022-08-22 VITALS
BODY MASS INDEX: 26.46 KG/M2 | TEMPERATURE: 98.5 F | SYSTOLIC BLOOD PRESSURE: 131 MMHG | OXYGEN SATURATION: 99 % | WEIGHT: 155 LBS | HEART RATE: 77 BPM | RESPIRATION RATE: 14 BRPM | DIASTOLIC BLOOD PRESSURE: 96 MMHG | HEIGHT: 64 IN

## 2022-08-22 DIAGNOSIS — M25.531 BILATERAL WRIST PAIN: ICD-10-CM
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
## 2022-08-22 DIAGNOSIS — R07.89 CHEST WALL PAIN: Primary | ICD-10-CM

## 2022-08-22 DIAGNOSIS — M25.532 BILATERAL WRIST PAIN: ICD-10-CM

## 2022-08-22 PROCEDURE — 99283 EMERGENCY DEPT VISIT LOW MDM: CPT

## 2022-08-22 PROCEDURE — 73110 X-RAY EXAM OF WRIST: CPT

## 2022-08-22 PROCEDURE — 71045 X-RAY EXAM CHEST 1 VIEW: CPT

## 2022-08-22 RX ORDER — IBUPROFEN 600 MG/1
600 TABLET ORAL ONCE
Status: COMPLETED | OUTPATIENT
Start: 2022-08-22 | End: 2022-08-22

## 2022-08-22 RX ORDER — ACETAMINOPHEN 500 MG
1000 TABLET ORAL ONCE
Status: COMPLETED | OUTPATIENT
Start: 2022-08-22 | End: 2022-08-22

## 2022-08-22 RX ORDER — CYCLOBENZAPRINE HCL 10 MG
10 TABLET ORAL 3 TIMES DAILY PRN
Qty: 15 TABLET | Refills: 0 | Status: SHIPPED | OUTPATIENT
Start: 2022-08-22

## 2022-08-22 RX ADMIN — ACETAMINOPHEN 1000 MG: 500 TABLET, FILM COATED ORAL at 16:21

## 2022-08-22 RX ADMIN — IBUPROFEN 600 MG: 600 TABLET ORAL at 16:21

## 2022-08-22 NOTE — DISCHARGE INSTRUCTIONS
Several studies have shown that the combination of ibuprofen and acetaminophen is more effective at pain relief than narcotics.    Take 2 over-the-counter Ibuprofen tablets every 6 hours as needed for pain. Try to take the medication with food. If you develop upper abdominal discomfort, discontinue use.    If not taking another medication which contains Tylenol/acetaminophen, you may also take Tylenol every 6 hours, with a maximum 1,000 mg (two extra strength tablets) per dose.